# Patient Record
Sex: FEMALE | Race: WHITE | NOT HISPANIC OR LATINO | Employment: UNEMPLOYED | URBAN - METROPOLITAN AREA
[De-identification: names, ages, dates, MRNs, and addresses within clinical notes are randomized per-mention and may not be internally consistent; named-entity substitution may affect disease eponyms.]

---

## 2019-07-16 ENCOUNTER — OFFICE VISIT (OUTPATIENT)
Dept: URGENT CARE | Facility: PHYSICIAN GROUP | Age: 54
End: 2019-07-16
Payer: COMMERCIAL

## 2019-07-16 VITALS
DIASTOLIC BLOOD PRESSURE: 84 MMHG | OXYGEN SATURATION: 93 % | SYSTOLIC BLOOD PRESSURE: 130 MMHG | TEMPERATURE: 97.4 F | HEIGHT: 62 IN | WEIGHT: 200 LBS | HEART RATE: 62 BPM | BODY MASS INDEX: 36.8 KG/M2

## 2019-07-16 DIAGNOSIS — S39.012A STRAIN OF LUMBAR PARASPINAL MUSCLE, INITIAL ENCOUNTER: ICD-10-CM

## 2019-07-16 PROCEDURE — 99214 OFFICE O/P EST MOD 30 MIN: CPT | Performed by: NURSE PRACTITIONER

## 2019-07-16 RX ORDER — CYCLOBENZAPRINE HCL 5 MG
5-10 TABLET ORAL 3 TIMES DAILY PRN
Qty: 30 TAB | Refills: 0 | Status: SHIPPED
Start: 2019-07-16 | End: 2020-01-17

## 2019-07-16 RX ORDER — IBUPROFEN 600 MG/1
600 TABLET ORAL EVERY 6 HOURS PRN
Qty: 30 TAB | Refills: 0 | Status: SHIPPED | OUTPATIENT
Start: 2019-07-16

## 2019-07-16 ASSESSMENT — ENCOUNTER SYMPTOMS
TINGLING: 0
WEAKNESS: 0
BACK PAIN: 1

## 2019-07-16 NOTE — LETTER
July 16, 2019         Patient: Glory Kessler   YOB: 1965   Date of Visit: 7/16/2019           To Whom it May Concern:    Glory Kessler was seen in my clinic on 7/16/2019. She may return to work on 07/17/2019. May return sooner if symptoms improve.     If you have any questions or concerns, please don't hesitate to call.        Sincerely,           MIGUEL Gresham.  Electronically Signed

## 2019-07-16 NOTE — PATIENT INSTRUCTIONS
Low Back Strain  A strain is a stretch or tear in a muscle or the strong cords of tissue that attach muscle to bone (tendons). Strains of the lower back (lumbar spine) are a common cause of low back pain. A strain occurs when muscles or tendons are torn or are stretched beyond their limits. The muscles may become inflamed, resulting in pain and sudden muscle tightening (spasms). A strain can happen suddenly due to an injury (trauma), or it can develop gradually due to overuse.  There are three types of strains:  · Grade 1 is a mild strain involving a minor tear of the muscle fibers or tendons. This may cause some pain but no loss of muscle strength.  · Grade 2 is a moderate strain involving a partial tear of the muscle fibers or tendons. This causes more severe pain and some loss of muscle strength.  · Grade 3 is a severe strain involving a complete tear of the muscle or tendon. This causes severe pain and complete or nearly complete loss of muscle strength.  What are the causes?  This condition may be caused by:  · Trauma, such as a fall or a hit to the body.  · Twisting or overstretching the back. This may result from doing activities that require a lot of energy, such as lifting heavy objects.  What increases the risk?  The following factors may increase your risk of getting this condition:  · Playing contact sports.  · Participating in sports or activities that put excessive stress on the back and require a lot of bending and twisting, including:  ¨ Lifting weights or heavy objects.  ¨ Gymnastics.  ¨ Soccer.  ¨ Figure skating.  ¨ Snowboarding.  · Being overweight or obese.  · Having poor strength and flexibility.  What are the signs or symptoms?  Symptoms of this condition may include:  · Sharp or dull pain in the lower back that does not go away. Pain may extend to the buttocks.  · Stiffness.  · Limited range of motion.  · Inability to stand up straight due to stiffness or pain.  · Muscle spasms.  How is this  diagnosed?     This condition may be diagnosed based on:  · Your symptoms.  · Your medical history.  · A physical exam.  ¨ Your health care provider may push on certain areas of your back to determine the source of your pain.  ¨ You may be asked to bend forward, backward, and side to side to assess the severity of your pain and your range of motion.  · Imaging tests, such as:  ¨ X-rays.  ¨ MRI.  How is this treated?  Treatment for this condition may include:  · Applying heat and cold to the affected area.  · Medicines to help relieve pain and to relax your muscles (muscle relaxants).  · NSAIDs to help reduce swelling and discomfort.  · Physical therapy.  When your symptoms improve, it is important to gradually return to your normal routine as soon as possible to reduce pain, avoid stiffness, and avoid loss of muscle strength. Generally, symptoms should improve within 6 weeks of treatment. However, recovery time varies.  Follow these instructions at home:  Managing pain, stiffness, and swelling  · If directed, apply ice to the injured area during the first 24 hours after your injury.  ¨ Put ice in a plastic bag.  ¨ Place a towel between your skin and the bag.  ¨ Leave the ice on for 20 minutes, 2-3 times a day.  · If directed, apply heat to the affected area as often as told by your health care provider. Use the heat source that your health care provider recommends, such as a moist heat pack or a heating pad.  ¨ Place a towel between your skin and the heat source.  ¨ Leave the heat on for 20-30 minutes.  ¨ Remove the heat if your skin turns bright red. This is especially important if you are unable to feel pain, heat, or cold. You may have a greater risk of getting burned.  Activity  · Rest and return to your normal activities as told by your health care provider. Ask your health care provider what activities are safe for you.  · Avoid activities that take a lot of effort (are strenuous) for as long as told by your  health care provider.  · Do exercises as told by your health care provider.  General instructions  · Take over-the-counter and prescription medicines only as told by your health care provider.  · If you have questions or concerns about safety while taking pain medicine, talk with your health care provider.  · Do not drive or operate heavy machinery until you know how your pain medicine affects you.  · Do not use any tobacco products, such as cigarettes, chewing tobacco, and e-cigarettes. Tobacco can delay bone healing. If you need help quitting, ask your health care provider.  · Keep all follow-up visits as told by your health care provider. This is important.  How is this prevented?  · Warm up and stretch before being active.  · Cool down and stretch after being active.  · Give your body time to rest between periods of activity.  · Avoid:  ¨ Being physically inactive for long periods at a time.  ¨ Exercising or playing sports when you are tired or in pain.  · Use correct form when playing sports and lifting heavy objects.  · Use good posture when sitting and standing.  · Maintain a healthy weight.  · Sleep on a mattress with medium firmness to support your back.  · Make sure to use equipment that fits you, including shoes that fit well.  · Be safe and responsible while being active to avoid falls.  · Do at least 150 minutes of moderate-intensity exercise each week, such as brisk walking or water aerobics. Try a form of exercise that takes stress off your back, such as swimming or stationary cycling.  · Maintain physical fitness, including:  ¨ Strength.  ¨ Flexibility.  ¨ Cardiovascular fitness.  ¨ Endurance.  Contact a health care provider if:  · Your back pain does not improve after 6 weeks of treatment.  · Your symptoms get worse.  Get help right away if:  · Your back pain is severe.  · You are unable to stand or walk.  · You develop pain in your legs.  · You develop weakness in your buttocks or legs.  · You have  difficulty controlling when you urinate or when you have a bowel movement.  This information is not intended to replace advice given to you by your health care provider. Make sure you discuss any questions you have with your health care provider.  Document Released: 12/18/2006 Document Revised: 08/24/2017 Document Reviewed: 09/28/2016  [employer] Interactive Patient Education © 2017 [employer]    Low Back Strain  A strain is a stretch or tear in a muscle or the strong cords of tissue that attach muscle to bone (tendons). Strains of the lower back (lumbar spine) are a common cause of low back pain. A strain occurs when muscles or tendons are torn or are stretched beyond their limits. The muscles may become inflamed, resulting in pain and sudden muscle tightening (spasms). A strain can happen suddenly due to an injury (trauma), or it can develop gradually due to overuse.  There are three types of strains:  · Grade 1 is a mild strain involving a minor tear of the muscle fibers or tendons. This may cause some pain but no loss of muscle strength.  · Grade 2 is a moderate strain involving a partial tear of the muscle fibers or tendons. This causes more severe pain and some loss of muscle strength.  · Grade 3 is a severe strain involving a complete tear of the muscle or tendon. This causes severe pain and complete or nearly complete loss of muscle strength.  What are the causes?  This condition may be caused by:  · Trauma, such as a fall or a hit to the body.  · Twisting or overstretching the back. This may result from doing activities that require a lot of energy, such as lifting heavy objects.  What increases the risk?  The following factors may increase your risk of getting this condition:  · Playing contact sports.  · Participating in sports or activities that put excessive stress on the back and require a lot of bending and twisting, including:  ¨ Lifting weights or heavy objects.  ¨ Gymnastics.  ¨ Soccer.  ¨ Figure  skating.  ¨ Snowboarding.  · Being overweight or obese.  · Having poor strength and flexibility.  What are the signs or symptoms?  Symptoms of this condition may include:  · Sharp or dull pain in the lower back that does not go away. Pain may extend to the buttocks.  · Stiffness.  · Limited range of motion.  · Inability to stand up straight due to stiffness or pain.  · Muscle spasms.  How is this diagnosed?     This condition may be diagnosed based on:  · Your symptoms.  · Your medical history.  · A physical exam.  ¨ Your health care provider may push on certain areas of your back to determine the source of your pain.  ¨ You may be asked to bend forward, backward, and side to side to assess the severity of your pain and your range of motion.  · Imaging tests, such as:  ¨ X-rays.  ¨ MRI.  How is this treated?  Treatment for this condition may include:  · Applying heat and cold to the affected area.  · Medicines to help relieve pain and to relax your muscles (muscle relaxants).  · NSAIDs to help reduce swelling and discomfort.  · Physical therapy.  When your symptoms improve, it is important to gradually return to your normal routine as soon as possible to reduce pain, avoid stiffness, and avoid loss of muscle strength. Generally, symptoms should improve within 6 weeks of treatment. However, recovery time varies.  Follow these instructions at home:  Managing pain, stiffness, and swelling  · If directed, apply ice to the injured area during the first 24 hours after your injury.  ¨ Put ice in a plastic bag.  ¨ Place a towel between your skin and the bag.  ¨ Leave the ice on for 20 minutes, 2-3 times a day.  · If directed, apply heat to the affected area as often as told by your health care provider. Use the heat source that your health care provider recommends, such as a moist heat pack or a heating pad.  ¨ Place a towel between your skin and the heat source.  ¨ Leave the heat on for 20-30 minutes.  ¨ Remove the heat if  your skin turns bright red. This is especially important if you are unable to feel pain, heat, or cold. You may have a greater risk of getting burned.  Activity  · Rest and return to your normal activities as told by your health care provider. Ask your health care provider what activities are safe for you.  · Avoid activities that take a lot of effort (are strenuous) for as long as told by your health care provider.  · Do exercises as told by your health care provider.  General instructions  · Take over-the-counter and prescription medicines only as told by your health care provider.  · If you have questions or concerns about safety while taking pain medicine, talk with your health care provider.  · Do not drive or operate heavy machinery until you know how your pain medicine affects you.  · Do not use any tobacco products, such as cigarettes, chewing tobacco, and e-cigarettes. Tobacco can delay bone healing. If you need help quitting, ask your health care provider.  · Keep all follow-up visits as told by your health care provider. This is important.  How is this prevented?  · Warm up and stretch before being active.  · Cool down and stretch after being active.  · Give your body time to rest between periods of activity.  · Avoid:  ¨ Being physically inactive for long periods at a time.  ¨ Exercising or playing sports when you are tired or in pain.  · Use correct form when playing sports and lifting heavy objects.  · Use good posture when sitting and standing.  · Maintain a healthy weight.  · Sleep on a mattress with medium firmness to support your back.  · Make sure to use equipment that fits you, including shoes that fit well.  · Be safe and responsible while being active to avoid falls.  · Do at least 150 minutes of moderate-intensity exercise each week, such as brisk walking or water aerobics. Try a form of exercise that takes stress off your back, such as swimming or stationary cycling.  · Maintain physical  "fitness, including:  ¨ Strength.  ¨ Flexibility.  ¨ Cardiovascular fitness.  ¨ Endurance.  Contact a health care provider if:  · Your back pain does not improve after 6 weeks of treatment.  · Your symptoms get worse.  Get help right away if:  · Your back pain is severe.  · You are unable to stand or walk.  · You develop pain in your legs.  · You develop weakness in your buttocks or legs.  · You have difficulty controlling when you urinate or when you have a bowel movement.  This information is not intended to replace advice given to you by your health care provider. Make sure you discuss any questions you have with your health care provider.  Document Released: 12/18/2006 Document Revised: 08/24/2017 Document Reviewed: 09/28/2016  [employer] Interactive Patient Education © 2017 [employer]    Low Back Sprain Rehab  Ask your health care provider which exercises are safe for you. Do exercises exactly as told by your health care provider and adjust them as directed. It is normal to feel mild stretching, pulling, tightness, or discomfort as you do these exercises, but you should stop right away if you feel sudden pain or your pain gets worse. Do not begin these exercises until told by your health care provider.  Stretching and range of motion exercises  These exercises warm up your muscles and joints and improve the movement and flexibility of your back. These exercises also help to relieve pain, numbness, and tingling.  Exercise A: Lumbar rotation  1. Lie on your back on a firm surface and bend your knees.  2. Straighten your arms out to your sides so each arm forms an \"L\" shape with a side of your body (a 90 degree angle).  3. Slowly move both of your knees to one side of your body until you feel a stretch in your lower back. Try not to let your shoulders move off of the floor.  4. Hold for __________ seconds.  5. Tense your abdominal muscles and slowly move your knees back to the starting position.  6. Repeat this " exercise on the other side of your body.  Repeat __________ times. Complete this exercise __________ times a day.  Exercise B: Prone extension on elbows  1. Lie on your abdomen on a firm surface.  2. Prop yourself up on your elbows.  3. Use your arms to help lift your chest up until you feel a gentle stretch in your abdomen and your lower back.  ¨ This will place some of your body weight on your elbows. If this is uncomfortable, try stacking pillows under your chest.  ¨ Your hips should stay down, against the surface that you are lying on. Keep your hip and back muscles relaxed.  4. Hold for __________ seconds.  5. Slowly relax your upper body and return to the starting position.  Repeat __________ times. Complete this exercise __________ times a day.  Strengthening exercises  These exercises build strength and endurance in your back. Endurance is the ability to use your muscles for a long time, even after they get tired.  Exercise C: Pelvic tilt  1. Lie on your back on a firm surface. Bend your knees and keep your feet flat.  2. Tense your abdominal muscles. Tip your pelvis up toward the ceiling and flatten your lower back into the floor.  ¨ To help with this exercise, you may place a small towel under your lower back and try to push your back into the towel.  3. Hold for __________ seconds.  4. Let your muscles relax completely before you repeat this exercise.  Repeat __________ times. Complete this exercise __________ times a day.  Exercise D: Alternating arm and leg raises  1. Get on your hands and knees on a firm surface. If you are on a hard floor, you may want to use padding to cushion your knees, such as an exercise mat.  2. Line up your arms and legs. Your hands should be below your shoulders, and your knees should be below your hips.  3. Lift your left leg behind you. At the same time, raise your right arm and straighten it in front of you.  ¨ Do not lift your leg higher than your hip.  ¨ Do not lift your  arm higher than your shoulder.  ¨ Keep your abdominal and back muscles tight.  ¨ Keep your hips facing the ground.  ¨ Do not arch your back.  ¨ Keep your balance carefully, and do not hold your breath.  4. Hold for __________ seconds.  5. Slowly return to the starting position and repeat with your right leg and your left arm.  Repeat __________ times. Complete this exercise __________ times a day.  Exercise E: Abdominal set with straight leg raise  1. Lie on your back on a firm surface.  2. Bend one of your knees and keep your other leg straight.  3. Tense your abdominal muscles and lift your straight leg up, 4-6 inches (10-15 cm) off the ground.  4. Keep your abdominal muscles tight and hold for __________ seconds.  ¨ Do not hold your breath.  ¨ Do not arch your back. Keep it flat against the ground.  5. Keep your abdominal muscles tense as you slowly lower your leg back to the starting position.  6. Repeat with your other leg.  Repeat __________ times. Complete this exercise __________ times a day.  Posture and body mechanics     Body mechanics refers to the movements and positions of your body while you do your daily activities. Posture is part of body mechanics. Good posture and healthy body mechanics can help to relieve stress in your body's tissues and joints. Good posture means that your spine is in its natural S-curve position (your spine is neutral), your shoulders are pulled back slightly, and your head is not tipped forward. The following are general guidelines for applying improved posture and body mechanics to your everyday activities.  Standing    · When standing, keep your spine neutral and your feet about hip-width apart. Keep a slight bend in your knees. Your ears, shoulders, and hips should line up.  · When you do a task in which you  one place for a long time, place one foot up on a stable object that is 2-4 inches (5-10 cm) high, such as a footstool. This helps keep your spine  neutral.  Sitting  · When sitting, keep your spine neutral and keep your feet flat on the floor. Use a footrest, if necessary, and keep your thighs parallel to the floor. Avoid rounding your shoulders, and avoid tilting your head forward.  · When working at a desk or a computer, keep your desk at a height where your hands are slightly lower than your elbows. Slide your chair under your desk so you are close enough to maintain good posture.  · When working at a computer, place your monitor at a height where you are looking straight ahead and you do not have to tilt your head forward or downward to look at the screen.  Resting    · When lying down and resting, avoid positions that are most painful for you.  · If you have pain with activities such as sitting, bending, stooping, or squatting (flexion-based activities), lie in a position in which your body does not bend very much. For example, avoid curling up on your side with your arms and knees near your chest (fetal position).  · If you have pain with activities such as standing for a long time or reaching with your arms (extension-based activities), lie with your spine in a neutral position and bend your knees slightly. Try the following positions:  · Lying on your side with a pillow between your knees.  · Lying on your back with a pillow under your knees.  Lifting    · When lifting objects, keep your feet at least shoulder-width apart and tighten your abdominal muscles.  · Bend your knees and hips and keep your spine neutral. It is important to lift using the strength of your legs, not your back. Do not lock your knees straight out.  · Always ask for help to lift heavy or awkward objects.  This information is not intended to replace advice given to you by your health care provider. Make sure you discuss any questions you have with your health care provider.  Document Released: 12/18/2006 Document Revised: 08/24/2017 Document Reviewed: 09/28/2016  [employer]  Interactive Patient Education © 2017 [employer]

## 2019-07-16 NOTE — PROGRESS NOTES
Subjective:     Glory Kessler is a 54 y.o. female who presents for Back Pain (L lower back pain/hip pain, constantly hurting, x1 day )      No history of injury or pain of back pain. States she was playing air plane with her grandchild and felt the pain. Has left lower hip//pain. Severe last nigh, which worried hert.       Back Pain   This is a new problem. The current episode started yesterday. The problem occurs constantly. The problem has been gradually worsening since onset. Pertinent negatives include no fever, pelvic pain, tingling or weakness. Risk factors: No history otsteo , cancer, steroid use.  Treatments tried: Aleve. The treatment provided no relief.       Past Medical History:   Diagnosis Date   • Carpal tunnel syndrome        Past Surgical History:   Procedure Laterality Date   • OTHER      breast augmentation, tubal ligation   • TONSILLECTOMY         Social History     Social History   • Marital status:      Spouse name: N/A   • Number of children: N/A   • Years of education: N/A     Occupational History   • Not on file.     Social History Main Topics   • Smoking status: Former Smoker     Types: Cigarettes   • Smokeless tobacco: Never Used      Comment: quit 3 years ago.   • Alcohol use Yes      Comment: occasional   • Drug use: No   • Sexual activity: Not on file     Other Topics Concern   • Not on file     Social History Narrative   • No narrative on file        Family History   Problem Relation Age of Onset   • Arthritis Mother    • Cancer Maternal Grandmother         Allergies   Allergen Reactions   • Doxycycline Rash     Face - possible rash from medication 7/7   • Pcn [Penicillins]        Review of Systems   Constitutional: Negative for chills and fever.   Genitourinary: Negative for pelvic pain.   Musculoskeletal: Positive for back pain.   Neurological: Negative for tingling, sensory change, focal weakness and weakness.   All other systems reviewed and are negative.       Objective:  "  /84 (BP Location: Right arm, Patient Position: Sitting, BP Cuff Size: Adult)   Pulse 62   Temp 36.3 °C (97.4 °F) (Temporal)   Ht 1.575 m (5' 2\")   Wt 90.7 kg (200 lb)   SpO2 93%   BMI 36.58 kg/m²     Physical Exam   Constitutional: She is oriented to person, place, and time. She appears well-developed. No distress.   HENT:   Head: Normocephalic.   Right Ear: External ear normal.   Left Ear: External ear normal.   Nose: Nose normal.   Mouth/Throat: Oropharynx is clear and moist.   Eyes: Pupils are equal, round, and reactive to light. Conjunctivae are normal.   Neck: Normal range of motion. Neck supple.   Cardiovascular: Normal rate.    Pulmonary/Chest: Effort normal.   Musculoskeletal: Normal range of motion. She exhibits tenderness.        Cervical back: Normal.        Thoracic back: Normal.        Lumbar back: She exhibits tenderness. She exhibits no bony tenderness, no swelling, no edema and no deformity.        Back:    Neurological: She is alert and oriented to person, place, and time. She has normal strength. No sensory deficit. GCS eye subscore is 4. GCS verbal subscore is 5. GCS motor subscore is 6.   Skin: Skin is warm and dry.   Psychiatric: Her behavior is normal. Judgment and thought content normal.   Vitals reviewed.      Assessment/Plan:   1. Strain of lumbar paraspinal muscle, initial encounter  - cyclobenzaprine (FLEXERIL) 5 MG tablet; Take 1-2 Tabs by mouth 3 times a day as needed.  Dispense: 30 Tab; Refill: 0  - ibuprofen (MOTRIN) 600 MG Tab; Take 1 Tab by mouth every 6 hours as needed.  Dispense: 30 Tab; Refill: 0  Temperature therapy: Ice or heat as tolerated.    Follow up for persistent back pain, numbness, weakness, paresthesia, loss of bowel or bladder, or any other concerns.    Differential diagnosis, natural history, supportive care, and indications for immediate follow-up discussed.  "

## 2019-07-16 NOTE — LETTER
July 16, 2019         Patient: Glory Kessler   YOB: 1965   Date of Visit: 7/16/2019           To Whom it May Concern:    Glory Kessler was seen in my clinic on 7/16/2019. She may return to work on 07/18/2019. May return sooner if symptoms improve.    If you have any questions or concerns, please don't hesitate to call.        Sincerely,           MIGUEL Gresham.  Electronically Signed

## 2019-07-19 ASSESSMENT — ENCOUNTER SYMPTOMS
FEVER: 0
FOCAL WEAKNESS: 0
SENSORY CHANGE: 0
CHILLS: 0

## 2020-01-17 ENCOUNTER — HOSPITAL ENCOUNTER (INPATIENT)
Facility: MEDICAL CENTER | Age: 55
LOS: 5 days | DRG: 871 | End: 2020-01-22
Attending: EMERGENCY MEDICINE | Admitting: HOSPITALIST
Payer: COMMERCIAL

## 2020-01-17 ENCOUNTER — APPOINTMENT (OUTPATIENT)
Dept: RADIOLOGY | Facility: MEDICAL CENTER | Age: 55
DRG: 871 | End: 2020-01-17
Attending: EMERGENCY MEDICINE
Payer: COMMERCIAL

## 2020-01-17 DIAGNOSIS — J18.9 PNEUMONIA OF RIGHT LOWER LOBE DUE TO INFECTIOUS ORGANISM: ICD-10-CM

## 2020-01-17 PROBLEM — E87.6 HYPOKALEMIA: Status: ACTIVE | Noted: 2020-01-17

## 2020-01-17 PROBLEM — E87.1 HYPONATREMIA: Status: ACTIVE | Noted: 2020-01-17

## 2020-01-17 PROBLEM — J06.9 UPPER RESPIRATORY INFECTION, ACUTE: Status: ACTIVE | Noted: 2020-01-17

## 2020-01-17 LAB
ANION GAP SERPL CALC-SCNC: 13 MMOL/L (ref 0–11.9)
APPEARANCE UR: CLEAR
BACTERIA #/AREA URNS HPF: ABNORMAL /HPF
BASOPHILS # BLD AUTO: 0.2 % (ref 0–1.8)
BASOPHILS # BLD: 0.02 K/UL (ref 0–0.12)
BILIRUB UR QL STRIP.AUTO: NEGATIVE
BUN SERPL-MCNC: 9 MG/DL (ref 8–22)
CALCIUM SERPL-MCNC: 9.1 MG/DL (ref 8.5–10.5)
CHLORIDE SERPL-SCNC: 96 MMOL/L (ref 96–112)
CO2 SERPL-SCNC: 21 MMOL/L (ref 20–33)
COLOR UR: YELLOW
CREAT SERPL-MCNC: 0.75 MG/DL (ref 0.5–1.4)
EOSINOPHIL # BLD AUTO: 0 K/UL (ref 0–0.51)
EOSINOPHIL NFR BLD: 0 % (ref 0–6.9)
EPI CELLS #/AREA URNS HPF: ABNORMAL /HPF
ERYTHROCYTE [DISTWIDTH] IN BLOOD BY AUTOMATED COUNT: 41.7 FL (ref 35.9–50)
FLUAV RNA SPEC QL NAA+PROBE: NEGATIVE
FLUBV RNA SPEC QL NAA+PROBE: NEGATIVE
GLUCOSE SERPL-MCNC: 115 MG/DL (ref 65–99)
GLUCOSE UR STRIP.AUTO-MCNC: NEGATIVE MG/DL
HCT VFR BLD AUTO: 37.5 % (ref 37–47)
HGB BLD-MCNC: 13.3 G/DL (ref 12–16)
HYALINE CASTS #/AREA URNS LPF: ABNORMAL /LPF
IMM GRANULOCYTES # BLD AUTO: 0.1 K/UL (ref 0–0.11)
IMM GRANULOCYTES NFR BLD AUTO: 0.8 % (ref 0–0.9)
INR PPP: 1.03 (ref 0.87–1.13)
KETONES UR STRIP.AUTO-MCNC: ABNORMAL MG/DL
LACTATE BLD-SCNC: 1.1 MMOL/L (ref 0.5–2)
LACTATE BLD-SCNC: 1.9 MMOL/L (ref 0.5–2)
LEUKOCYTE ESTERASE UR QL STRIP.AUTO: ABNORMAL
LYMPHOCYTES # BLD AUTO: 0.81 K/UL (ref 1–4.8)
LYMPHOCYTES NFR BLD: 6.8 % (ref 22–41)
MAGNESIUM SERPL-MCNC: 1.5 MG/DL (ref 1.5–2.5)
MCH RBC QN AUTO: 32.4 PG (ref 27–33)
MCHC RBC AUTO-ENTMCNC: 35.5 G/DL (ref 33.6–35)
MCV RBC AUTO: 91.5 FL (ref 81.4–97.8)
MICRO URNS: ABNORMAL
MONOCYTES # BLD AUTO: 0.62 K/UL (ref 0–0.85)
MONOCYTES NFR BLD AUTO: 5.2 % (ref 0–13.4)
NEUTROPHILS # BLD AUTO: 10.34 K/UL (ref 2–7.15)
NEUTROPHILS NFR BLD: 87 % (ref 44–72)
NITRITE UR QL STRIP.AUTO: NEGATIVE
NRBC # BLD AUTO: 0 K/UL
NRBC BLD-RTO: 0 /100 WBC
PH UR STRIP.AUTO: 7 [PH] (ref 5–8)
PLATELET # BLD AUTO: 238 K/UL (ref 164–446)
PMV BLD AUTO: 9.9 FL (ref 9–12.9)
POTASSIUM SERPL-SCNC: 3.1 MMOL/L (ref 3.6–5.5)
PROCALCITONIN SERPL-MCNC: 0.87 NG/ML
PROT UR QL STRIP: 30 MG/DL
PROTHROMBIN TIME: 13.7 SEC (ref 12–14.6)
RBC # BLD AUTO: 4.1 M/UL (ref 4.2–5.4)
RBC # URNS HPF: ABNORMAL /HPF
RBC UR QL AUTO: NEGATIVE
RENAL EPI CELLS #/AREA URNS HPF: ABNORMAL /HPF
SODIUM SERPL-SCNC: 130 MMOL/L (ref 135–145)
SP GR UR STRIP.AUTO: 1.01
UROBILINOGEN UR STRIP.AUTO-MCNC: 1 MG/DL
WBC # BLD AUTO: 11.9 K/UL (ref 4.8–10.8)
WBC #/AREA URNS HPF: ABNORMAL /HPF

## 2020-01-17 PROCEDURE — 99285 EMERGENCY DEPT VISIT HI MDM: CPT

## 2020-01-17 PROCEDURE — 96365 THER/PROPH/DIAG IV INF INIT: CPT

## 2020-01-17 PROCEDURE — 87502 INFLUENZA DNA AMP PROBE: CPT

## 2020-01-17 PROCEDURE — 700102 HCHG RX REV CODE 250 W/ 637 OVERRIDE(OP): Performed by: EMERGENCY MEDICINE

## 2020-01-17 PROCEDURE — 83735 ASSAY OF MAGNESIUM: CPT

## 2020-01-17 PROCEDURE — 85610 PROTHROMBIN TIME: CPT

## 2020-01-17 PROCEDURE — 94760 N-INVAS EAR/PLS OXIMETRY 1: CPT

## 2020-01-17 PROCEDURE — 770006 HCHG ROOM/CARE - MED/SURG/GYN SEMI*

## 2020-01-17 PROCEDURE — 87077 CULTURE AEROBIC IDENTIFY: CPT

## 2020-01-17 PROCEDURE — A9270 NON-COVERED ITEM OR SERVICE: HCPCS | Performed by: HOSPITALIST

## 2020-01-17 PROCEDURE — 99223 1ST HOSP IP/OBS HIGH 75: CPT | Performed by: HOSPITALIST

## 2020-01-17 PROCEDURE — 96368 THER/DIAG CONCURRENT INF: CPT

## 2020-01-17 PROCEDURE — 304561 HCHG STAT O2

## 2020-01-17 PROCEDURE — 700111 HCHG RX REV CODE 636 W/ 250 OVERRIDE (IP): Performed by: EMERGENCY MEDICINE

## 2020-01-17 PROCEDURE — 80048 BASIC METABOLIC PNL TOTAL CA: CPT

## 2020-01-17 PROCEDURE — 700105 HCHG RX REV CODE 258: Performed by: EMERGENCY MEDICINE

## 2020-01-17 PROCEDURE — 71045 X-RAY EXAM CHEST 1 VIEW: CPT

## 2020-01-17 PROCEDURE — 700105 HCHG RX REV CODE 258: Performed by: HOSPITALIST

## 2020-01-17 PROCEDURE — 81001 URINALYSIS AUTO W/SCOPE: CPT

## 2020-01-17 PROCEDURE — A9270 NON-COVERED ITEM OR SERVICE: HCPCS | Performed by: EMERGENCY MEDICINE

## 2020-01-17 PROCEDURE — 84145 PROCALCITONIN (PCT): CPT

## 2020-01-17 PROCEDURE — 83605 ASSAY OF LACTIC ACID: CPT | Mod: 91

## 2020-01-17 PROCEDURE — 87086 URINE CULTURE/COLONY COUNT: CPT

## 2020-01-17 PROCEDURE — 700102 HCHG RX REV CODE 250 W/ 637 OVERRIDE(OP): Performed by: HOSPITALIST

## 2020-01-17 PROCEDURE — 96375 TX/PRO/DX INJ NEW DRUG ADDON: CPT

## 2020-01-17 PROCEDURE — 85025 COMPLETE CBC W/AUTO DIFF WBC: CPT

## 2020-01-17 PROCEDURE — 87040 BLOOD CULTURE FOR BACTERIA: CPT | Mod: 91

## 2020-01-17 PROCEDURE — 36415 COLL VENOUS BLD VENIPUNCTURE: CPT

## 2020-01-17 RX ORDER — OMEPRAZOLE 20 MG/1
20 CAPSULE, DELAYED RELEASE ORAL DAILY
Status: DISCONTINUED | OUTPATIENT
Start: 2020-01-17 | End: 2020-01-22 | Stop reason: HOSPADM

## 2020-01-17 RX ORDER — PROMETHAZINE HYDROCHLORIDE 25 MG/1
12.5-25 TABLET ORAL EVERY 4 HOURS PRN
Status: DISCONTINUED | OUTPATIENT
Start: 2020-01-17 | End: 2020-01-22 | Stop reason: HOSPADM

## 2020-01-17 RX ORDER — ONDANSETRON 2 MG/ML
4 INJECTION INTRAMUSCULAR; INTRAVENOUS ONCE
Status: COMPLETED | OUTPATIENT
Start: 2020-01-17 | End: 2020-01-17

## 2020-01-17 RX ORDER — SODIUM CHLORIDE 9 MG/ML
1000 INJECTION, SOLUTION INTRAVENOUS ONCE
Status: COMPLETED | OUTPATIENT
Start: 2020-01-17 | End: 2020-01-17

## 2020-01-17 RX ORDER — ACETAMINOPHEN 325 MG/1
650 TABLET ORAL EVERY 6 HOURS PRN
Status: DISCONTINUED | OUTPATIENT
Start: 2020-01-17 | End: 2020-01-22 | Stop reason: HOSPADM

## 2020-01-17 RX ORDER — AZITHROMYCIN 250 MG/1
500 TABLET, FILM COATED ORAL DAILY
Status: COMPLETED | OUTPATIENT
Start: 2020-01-18 | End: 2020-01-19

## 2020-01-17 RX ORDER — BENZONATATE 100 MG/1
100 CAPSULE ORAL 3 TIMES DAILY PRN
Status: DISCONTINUED | OUTPATIENT
Start: 2020-01-17 | End: 2020-01-22 | Stop reason: HOSPADM

## 2020-01-17 RX ORDER — AZITHROMYCIN 500 MG/1
500 INJECTION, POWDER, LYOPHILIZED, FOR SOLUTION INTRAVENOUS ONCE
Status: COMPLETED | OUTPATIENT
Start: 2020-01-17 | End: 2020-01-17

## 2020-01-17 RX ORDER — ACETAMINOPHEN 325 MG/1
650 TABLET ORAL ONCE
Status: COMPLETED | OUTPATIENT
Start: 2020-01-17 | End: 2020-01-17

## 2020-01-17 RX ORDER — AMOXICILLIN 250 MG
2 CAPSULE ORAL 2 TIMES DAILY
Status: DISCONTINUED | OUTPATIENT
Start: 2020-01-17 | End: 2020-01-22 | Stop reason: HOSPADM

## 2020-01-17 RX ORDER — SODIUM CHLORIDE, SODIUM LACTATE, POTASSIUM CHLORIDE, CALCIUM CHLORIDE 600; 310; 30; 20 MG/100ML; MG/100ML; MG/100ML; MG/100ML
INJECTION, SOLUTION INTRAVENOUS CONTINUOUS
Status: DISCONTINUED | OUTPATIENT
Start: 2020-01-17 | End: 2020-01-19

## 2020-01-17 RX ORDER — SODIUM CHLORIDE, SODIUM LACTATE, POTASSIUM CHLORIDE, AND CALCIUM CHLORIDE .6; .31; .03; .02 G/100ML; G/100ML; G/100ML; G/100ML
500 INJECTION, SOLUTION INTRAVENOUS
Status: ACTIVE | OUTPATIENT
Start: 2020-01-17 | End: 2020-01-18

## 2020-01-17 RX ORDER — PROMETHAZINE HYDROCHLORIDE 12.5 MG/1
12.5-25 SUPPOSITORY RECTAL EVERY 4 HOURS PRN
Status: DISCONTINUED | OUTPATIENT
Start: 2020-01-17 | End: 2020-01-22 | Stop reason: HOSPADM

## 2020-01-17 RX ORDER — BISACODYL 10 MG
10 SUPPOSITORY, RECTAL RECTAL
Status: DISCONTINUED | OUTPATIENT
Start: 2020-01-17 | End: 2020-01-22 | Stop reason: HOSPADM

## 2020-01-17 RX ORDER — ACETAMINOPHEN 500 MG
500-1000 TABLET ORAL EVERY 6 HOURS PRN
COMMUNITY

## 2020-01-17 RX ORDER — POLYETHYLENE GLYCOL 3350 17 G/17G
1 POWDER, FOR SOLUTION ORAL
Status: DISCONTINUED | OUTPATIENT
Start: 2020-01-17 | End: 2020-01-22 | Stop reason: HOSPADM

## 2020-01-17 RX ORDER — ONDANSETRON 2 MG/ML
4 INJECTION INTRAMUSCULAR; INTRAVENOUS EVERY 4 HOURS PRN
Status: DISCONTINUED | OUTPATIENT
Start: 2020-01-17 | End: 2020-01-22 | Stop reason: HOSPADM

## 2020-01-17 RX ORDER — SODIUM CHLORIDE, SODIUM LACTATE, POTASSIUM CHLORIDE, AND CALCIUM CHLORIDE .6; .31; .03; .02 G/100ML; G/100ML; G/100ML; G/100ML
30 INJECTION, SOLUTION INTRAVENOUS
Status: ACTIVE | OUTPATIENT
Start: 2020-01-17 | End: 2020-01-18

## 2020-01-17 RX ORDER — POTASSIUM CHLORIDE 20 MEQ/1
40 TABLET, EXTENDED RELEASE ORAL 3 TIMES DAILY
Status: COMPLETED | OUTPATIENT
Start: 2020-01-17 | End: 2020-01-17

## 2020-01-17 RX ORDER — OMEPRAZOLE 20 MG/1
20 CAPSULE, DELAYED RELEASE ORAL DAILY
COMMUNITY
End: 2020-01-17

## 2020-01-17 RX ORDER — ONDANSETRON 4 MG/1
4 TABLET, ORALLY DISINTEGRATING ORAL EVERY 4 HOURS PRN
Status: DISCONTINUED | OUTPATIENT
Start: 2020-01-17 | End: 2020-01-22 | Stop reason: HOSPADM

## 2020-01-17 RX ORDER — IBUPROFEN 600 MG/1
600 TABLET ORAL ONCE
Status: COMPLETED | OUTPATIENT
Start: 2020-01-17 | End: 2020-01-17

## 2020-01-17 RX ORDER — ENALAPRILAT 1.25 MG/ML
1.25 INJECTION INTRAVENOUS EVERY 6 HOURS PRN
Status: DISCONTINUED | OUTPATIENT
Start: 2020-01-17 | End: 2020-01-22 | Stop reason: HOSPADM

## 2020-01-17 RX ORDER — PROCHLORPERAZINE EDISYLATE 5 MG/ML
5-10 INJECTION INTRAMUSCULAR; INTRAVENOUS EVERY 4 HOURS PRN
Status: DISCONTINUED | OUTPATIENT
Start: 2020-01-17 | End: 2020-01-22 | Stop reason: HOSPADM

## 2020-01-17 RX ADMIN — ACETAMINOPHEN 650 MG: 325 TABLET, FILM COATED ORAL at 18:11

## 2020-01-17 RX ADMIN — AZITHROMYCIN MONOHYDRATE 500 MG: 500 INJECTION, POWDER, LYOPHILIZED, FOR SOLUTION INTRAVENOUS at 10:50

## 2020-01-17 RX ADMIN — POTASSIUM CHLORIDE 40 MEQ: 1500 TABLET, EXTENDED RELEASE ORAL at 13:25

## 2020-01-17 RX ADMIN — IBUPROFEN 600 MG: 600 TABLET ORAL at 10:49

## 2020-01-17 RX ADMIN — ONDANSETRON 4 MG: 2 INJECTION INTRAMUSCULAR; INTRAVENOUS at 10:59

## 2020-01-17 RX ADMIN — SODIUM CHLORIDE 1000 ML: 9 INJECTION, SOLUTION INTRAVENOUS at 10:45

## 2020-01-17 RX ADMIN — OMEPRAZOLE 20 MG: 20 CAPSULE, DELAYED RELEASE ORAL at 13:24

## 2020-01-17 RX ADMIN — POTASSIUM CHLORIDE 40 MEQ: 1500 TABLET, EXTENDED RELEASE ORAL at 18:02

## 2020-01-17 RX ADMIN — SODIUM CHLORIDE, POTASSIUM CHLORIDE, SODIUM LACTATE AND CALCIUM CHLORIDE: 600; 310; 30; 20 INJECTION, SOLUTION INTRAVENOUS at 18:11

## 2020-01-17 RX ADMIN — ACETAMINOPHEN 650 MG: 325 TABLET, FILM COATED ORAL at 10:49

## 2020-01-17 RX ADMIN — CEFTRIAXONE SODIUM 2 G: 2 INJECTION, POWDER, FOR SOLUTION INTRAMUSCULAR; INTRAVENOUS at 10:49

## 2020-01-17 SDOH — ECONOMIC STABILITY: TRANSPORTATION INSECURITY
IN THE PAST 12 MONTHS, HAS THE LACK OF TRANSPORTATION KEPT YOU FROM MEDICAL APPOINTMENTS OR FROM GETTING MEDICATIONS?: NO

## 2020-01-17 SDOH — ECONOMIC STABILITY: FOOD INSECURITY: WITHIN THE PAST 12 MONTHS, YOU WORRIED THAT YOUR FOOD WOULD RUN OUT BEFORE YOU GOT MONEY TO BUY MORE.: NEVER TRUE

## 2020-01-17 SDOH — ECONOMIC STABILITY: FOOD INSECURITY: WITHIN THE PAST 12 MONTHS, THE FOOD YOU BOUGHT JUST DIDN'T LAST AND YOU DIDN'T HAVE MONEY TO GET MORE.: NEVER TRUE

## 2020-01-17 SDOH — ECONOMIC STABILITY: TRANSPORTATION INSECURITY
IN THE PAST 12 MONTHS, HAS LACK OF TRANSPORTATION KEPT YOU FROM MEETINGS, WORK, OR FROM GETTING THINGS NEEDED FOR DAILY LIVING?: NO

## 2020-01-17 ASSESSMENT — LIFESTYLE VARIABLES
TOTAL SCORE: 0
TOTAL SCORE: 0
CONSUMPTION TOTAL: NEGATIVE
ON A TYPICAL DAY WHEN YOU DRINK ALCOHOL HOW MANY DRINKS DO YOU HAVE: 1
HAVE PEOPLE ANNOYED YOU BY CRITICIZING YOUR DRINKING: NO
TOTAL SCORE: 0
EVER HAD A DRINK FIRST THING IN THE MORNING TO STEADY YOUR NERVES TO GET RID OF A HANGOVER: NO
HAVE YOU EVER FELT YOU SHOULD CUT DOWN ON YOUR DRINKING: NO
ALCOHOL_USE: YES
AVERAGE NUMBER OF DAYS PER WEEK YOU HAVE A DRINK CONTAINING ALCOHOL: 1
DOES PATIENT WANT TO STOP DRINKING: NO
HOW MANY TIMES IN THE PAST YEAR HAVE YOU HAD 5 OR MORE DRINKS IN A DAY: 0
EVER FELT BAD OR GUILTY ABOUT YOUR DRINKING: NO
EVER_SMOKED: NEVER

## 2020-01-17 ASSESSMENT — COGNITIVE AND FUNCTIONAL STATUS - GENERAL
MOBILITY SCORE: 20
MOVING TO AND FROM BED TO CHAIR: A LITTLE
DAILY ACTIVITIY SCORE: 24
STANDING UP FROM CHAIR USING ARMS: A LITTLE
CLIMB 3 TO 5 STEPS WITH RAILING: A LITTLE
SUGGESTED CMS G CODE MODIFIER MOBILITY: CJ
SUGGESTED CMS G CODE MODIFIER DAILY ACTIVITY: CH
MOVING FROM LYING ON BACK TO SITTING ON SIDE OF FLAT BED: A LITTLE

## 2020-01-17 ASSESSMENT — ENCOUNTER SYMPTOMS
DIARRHEA: 1
CONSTIPATION: 0
SORE THROAT: 1
NAUSEA: 1
SPEECH CHANGE: 0
SPUTUM PRODUCTION: 0
FEVER: 0
DIAPHORESIS: 1
CHILLS: 1
MYALGIAS: 1
SINUS PAIN: 1
NECK PAIN: 0
COUGH: 1
NERVOUS/ANXIOUS: 0
HEADACHES: 0
ABDOMINAL PAIN: 0
VOMITING: 0
DIZZINESS: 0
SHORTNESS OF BREATH: 1

## 2020-01-17 ASSESSMENT — PATIENT HEALTH QUESTIONNAIRE - PHQ9
2. FEELING DOWN, DEPRESSED, IRRITABLE, OR HOPELESS: NOT AT ALL
SUM OF ALL RESPONSES TO PHQ9 QUESTIONS 1 AND 2: 0
1. LITTLE INTEREST OR PLEASURE IN DOING THINGS: NOT AT ALL

## 2020-01-17 NOTE — ED TRIAGE NOTES
54 y/o female ambulatory to triage with c/o flu like symptoms since Tuesday. Pt states she has a headache, body aches, cough and is now feeling sob.

## 2020-01-17 NOTE — ED NOTES
Pts o2 sats at 94% on room air at bedside, pt reports sob, placed on 2 liters via nasal cannula.

## 2020-01-17 NOTE — ED PROVIDER NOTES
ED Provider Note    Scribed for Chet Dao M.D. by Deirdre Nava. 1/17/2020  10:06 AM    Primary care provider: Pcp Pt States None  Means of arrival: Walk-in  History obtained from: Patient  History limited by: None    CHIEF COMPLAINT  Chief Complaint   Patient presents with   • Flu Like Symptoms   • Body Aches   • Headache   • Shortness of Breath     HPI  Glory Kessler is a 55 y.o. female who presents to the Emergency Department for fever and chills that began 4 days ago. In the last 24 hours, her symptoms have worsened and she has begun to experience shortness of breath with a dry cough. The patient reports mild nausea, diarrhea, mild abdominal cramping, and headache with temporal and maxillary sinus pressure. She denies vomiting or chest pain. She states she received a flu vaccine this year. Additionally, she denies any sick contact or travel outside the [place]. She states she has used ibuprofen, josh seltzer, and Theraflu with no symptom alleviation. The patient denies a history of smoking. Additionally, she denies any history of COPD, other lung problems, hypertension, or diabetes. She receives steroid injections for carpal tunnel. Additionally, she reports a history of pneumonia. The patient drinks a beer a week and denies drug use. Her last menstrual period was 4 years ago.     REVIEW OF SYSTEMS  Pertinent positives include fever, chills, shortness of breath, dry cough, nausea, diarrhea, abdominal cramping, and headache with temporal and maxillary sinus pressure.   Pertinent negatives include no vomiting or chest pain.    All other systems reviewed and negative. See HPI for further details.     PAST MEDICAL HISTORY   has a past medical history of Carpal tunnel syndrome and Plantar fasciitis of left foot.    SURGICAL HISTORY   has a past surgical history that includes tonsillectomy and other.    SOCIAL HISTORY  Social History     Tobacco Use   • Smoking status: Former Smoker     Packs/day: 0.00      "Types: Cigarettes   • Smokeless tobacco: Never Used   • Tobacco comment: quit 3 years ago.   Substance Use Topics   • Alcohol use: Yes     Comment: occasional   • Drug use: No      Social History     Substance and Sexual Activity   Drug Use No     FAMILY HISTORY  Family History   Problem Relation Age of Onset   • Arthritis Mother    • Cancer Maternal Grandmother      CURRENT MEDICATIONS  Home Medications     Reviewed by Ganesh Pennington R.N. (Registered Nurse) on 01/17/20 at 0908  Med List Status: Partial   Medication Last Dose Status   acetaminophen (TYLENOL) 325 MG TABS  Active   conjugated estrogen (PREMARIN) 0.625 MG/GM Cream  Active   ibuprofen (MOTRIN) 600 MG Tab 1/17/2020 Active   Multiple Vitamin (DAILY VITAMINS PO) 1/16/2020 Active   omeprazole (PRILOSEC) 20 MG delayed-release capsule  Active              ALLERGIES  Allergies   Allergen Reactions   • Pcn [Penicillins]      PHYSICAL EXAM  VITAL SIGNS: /70   Pulse (!) 110   Temp (!) 38.2 °C (100.7 °F) (Oral)   Resp 16   Ht 1.575 m (5' 2\")   Wt 89.9 kg (198 lb 3.1 oz)   LMP 07/07/2016 (Exact Date)   SpO2 93%   BMI 36.25 kg/m²     Nursing note and vitals reviewed.  Constitutional: Well-developed and well-nourished. No distress.   HENT: Head is normocephalic and atraumatic. Oropharynx is clear and moist without exudate or erythema.   Eyes: Pupils are equal, round, and reactive to light. Conjunctiva are normal.   Cardiovascular: Tachycardic rate and regular rhythm. No murmur heard. No rubs or gallops. Normal radial pulses.  Pulmonary/Chest: Breath sounds diminished. No wheezes or rales.   Abdominal: Soft and non-tender. No distention    Musculoskeletal: Extremities exhibit normal range of motion without edema or tenderness.   Neurological: Awake, alert and oriented to person, place, and time. No focal deficits noted.  Skin: Skin is warm and dry. No rash.   Psychiatric: Normal mood and affect. Appropriate for clinical situation.    DIAGNOSTIC " STUDIES / PROCEDURES    LABS  Results for orders placed or performed during the hospital encounter of 01/17/20   Influenza A/B By PCR (Adult - Flu Only)   Result Value Ref Range    Influenza virus A RNA Negative Negative    Influenza virus B, PCR Negative Negative   CBC WITH DIFFERENTIAL   Result Value Ref Range    WBC 11.9 (H) 4.8 - 10.8 K/uL    RBC 4.10 (L) 4.20 - 5.40 M/uL    Hemoglobin 13.3 12.0 - 16.0 g/dL    Hematocrit 37.5 37.0 - 47.0 %    MCV 91.5 81.4 - 97.8 fL    MCH 32.4 27.0 - 33.0 pg    MCHC 35.5 (H) 33.6 - 35.0 g/dL    RDW 41.7 35.9 - 50.0 fL    Platelet Count 238 164 - 446 K/uL    MPV 9.9 9.0 - 12.9 fL    Neutrophils-Polys 87.00 (H) 44.00 - 72.00 %    Lymphocytes 6.80 (L) 22.00 - 41.00 %    Monocytes 5.20 0.00 - 13.40 %    Eosinophils 0.00 0.00 - 6.90 %    Basophils 0.20 0.00 - 1.80 %    Immature Granulocytes 0.80 0.00 - 0.90 %    Nucleated RBC 0.00 /100 WBC    Neutrophils (Absolute) 10.34 (H) 2.00 - 7.15 K/uL    Lymphs (Absolute) 0.81 (L) 1.00 - 4.80 K/uL    Monos (Absolute) 0.62 0.00 - 0.85 K/uL    Eos (Absolute) 0.00 0.00 - 0.51 K/uL    Baso (Absolute) 0.02 0.00 - 0.12 K/uL    Immature Granulocytes (abs) 0.10 0.00 - 0.11 K/uL    NRBC (Absolute) 0.00 K/uL   BASIC METABOLIC PANEL   Result Value Ref Range    Sodium 130 (L) 135 - 145 mmol/L    Potassium 3.1 (L) 3.6 - 5.5 mmol/L    Chloride 96 96 - 112 mmol/L    Co2 21 20 - 33 mmol/L    Glucose 115 (H) 65 - 99 mg/dL    Bun 9 8 - 22 mg/dL    Creatinine 0.75 0.50 - 1.40 mg/dL    Calcium 9.1 8.5 - 10.5 mg/dL    Anion Gap 13.0 (H) 0.0 - 11.9   LACTIC ACID   Result Value Ref Range    Lactic Acid 1.9 0.5 - 2.0 mmol/L   URINALYSIS CULTURE, IF INDICATED   Result Value Ref Range    Micro Urine Req Microscopic    ESTIMATED GFR   Result Value Ref Range    GFR If African American >60 >60 mL/min/1.73 m 2    GFR If Non African American >60 >60 mL/min/1.73 m 2     All labs reviewed by me.    RADIOLOGY  DX-CHEST-PORTABLE (1 VIEW)   Final Result      Right basilar  opacity is consistent with pneumonia. Follow-up imaging is recommended to document resolution.        The radiologist's interpretation of all radiological studies have been reviewed by me.    COURSE & MEDICAL DECISION MAKING  Nursing notes, VS, PMSFHx reviewed in chart.     Review of past medical records shows the patient was seen in the ED three years ago for pneumonia.       10:06 AM - Patient seen and examined at bedside. Patient will be treated with NS Infusion 1000 mL, Tylenol 650 mg, Motrin 600 mg, Zithromax 500 mg, and Rocephin 2 g. Intravenous fluids were administered for tachycardia. Ordered DX-Chest-Portable, Estimated GFR, CBC with Differential, BMP, Blood Culture, Lactic Acid, UA Culture, if Indicated, and Influenza A/B by PCR (Adult - Flu Only) to evaluate her symptoms. The differential diagnoses include but are not limited to: pneumonia versus influenza     10:54 AM - Patient will be treated with Zofran 4 mg.    11:11 AM - Ordered Urine Microscopic.    11:44 AM - Reviewed lab and radiology results, which showed patient is positive for pneumonia.    11:59 AM - On repeat evaluation, she feels the same. after fluids heart rate has improved. I updated patient on the findings and plan for hospitalization. She understands and agrees.    12:05 PM - Paged Hospitalist.    12:27 PM - I discussed the patient's case and the above findings with Dr. Matamoros (Hospitalist) who agrees to evaluate patient for hospitalization.     HYDRATION: Based on the patient's presentation of Tachycardia the patient was given IV fluids. IV Hydration was used because oral hydration was not adequate alone. Upon recheck following hydration, the patient was Improved.    DISPOSITION:  Patient will be hospitalized by Dr. Matamoros, Hospitalist, in guarded condition.     FINAL IMPRESSION  1. Pneumonia of right lower lobe due to infectious organism (HCC)          Deirdre CASTRO (Scribe), am scribing for, and in the presence of, Chet LEYVA  JACQUES Dao.    Electronically signed by: Deirdre Nava (Scribe), 1/17/2020    IChet M.D. personally performed the services described in this documentation, as scribed by Deirdre Nava in my presence, and it is both accurate and complete. C    The note accurately reflects work and decisions made by me.  Chet Dao M.D.  1/17/2020  5:13 PM

## 2020-01-17 NOTE — ED NOTES
called for added labs.  Tech stated they could be added to previously sent blood.  Duplicate orders for blood cultures placed by admitting.

## 2020-01-17 NOTE — H&P
Hospital Medicine History & Physical Note    Date of Service  1/17/2020    Primary Care Physician  Pcp Pt States None    Consultants  None    Code Status  FULL    Chief Complaint  Short of breath, cough, weak    History of Presenting Illness  55 y.o. female who presented 1/17/2020 with flu-like symptoms over last 4 days.  Here influenza screen was negative.  She had no travel nor known sick contacts.  She does have some occassional nausea, congestion, sore throat and mild cough.  She also states body aches and short of breath.    Review of Systems  Review of Systems   Constitutional: Positive for chills, diaphoresis and malaise/fatigue. Negative for fever.   HENT: Positive for congestion, sinus pain and sore throat.    Respiratory: Positive for cough and shortness of breath. Negative for sputum production.    Cardiovascular: Negative for chest pain and leg swelling.   Gastrointestinal: Positive for diarrhea and nausea. Negative for abdominal pain, constipation, melena and vomiting.   Genitourinary: Negative for dysuria and hematuria.   Musculoskeletal: Positive for myalgias. Negative for neck pain.   Skin: Negative for rash.   Neurological: Negative for dizziness, speech change and headaches.   Psychiatric/Behavioral: The patient is not nervous/anxious.        Past Medical History   has a past medical history of Carpal tunnel syndrome and Plantar fasciitis of left foot.    Surgical History   has a past surgical history that includes tonsillectomy; other; and other orthopedic surgery.     Family History  family history includes Arthritis in her mother; Cancer in her maternal grandmother.     Social History   reports that she has quit smoking. Her smoking use included cigarettes. She smoked 0.00 packs per day. She has never used smokeless tobacco. She reports current alcohol use. She reports that she does not use drugs.    Allergies  Allergies   Allergen Reactions   • Pcn [Penicillins]        Medications  Prior to  Admission Medications   Prescriptions Last Dose Informant Patient Reported? Taking?   Multiple Vitamin (DAILY VITAMINS PO) 1/16/2020 at Unknown time Patient Yes No   Sig: Take  by mouth.   acetaminophen (TYLENOL) 325 MG TABS  Patient Yes No   Sig: Take 650 mg by mouth every four hours as needed.   conjugated estrogen (PREMARIN) 0.625 MG/GM Cream  Patient Yes No   Sig: Insert 0.5 g in vagina 2X A WEEK.   ibuprofen (MOTRIN) 600 MG Tab 1/17/2020 at Unknown time  No No   Sig: Take 1 Tab by mouth every 6 hours as needed.   omeprazole (PRILOSEC) 20 MG delayed-release capsule   Yes Yes   Sig: Take 20 mg by mouth every day.      Facility-Administered Medications: None       Physical Exam  Temp:  [36.4 °C (97.5 °F)-38.2 °C (100.7 °F)] 36.9 °C (98.4 °F)  Pulse:  [] 107  Resp:  [16-20] 20  BP: ()/(49-76) 95/49  SpO2:  [90 %-96 %] 90 %    Physical Exam  Vitals signs reviewed.   Constitutional:       Appearance: Normal appearance. She is ill-appearing and diaphoretic.   HENT:      Head: Normocephalic and atraumatic.      Nose: Nose normal. No congestion.      Mouth/Throat:      Mouth: Mucous membranes are moist.      Pharynx: No oropharyngeal exudate.   Eyes:      General: No scleral icterus.        Right eye: No discharge.         Left eye: No discharge.      Extraocular Movements: Extraocular movements intact.      Conjunctiva/sclera: Conjunctivae normal.   Neck:      Musculoskeletal: No muscular tenderness.      Vascular: No carotid bruit.   Cardiovascular:      Rate and Rhythm: Normal rate and regular rhythm.      Pulses:           Radial pulses are 2+ on the right side and 2+ on the left side.        Dorsalis pedis pulses are 2+ on the right side and 2+ on the left side.      Heart sounds: No murmur.   Pulmonary:      Effort: Pulmonary effort is normal. No respiratory distress.      Breath sounds: Normal breath sounds. No wheezing or rales.   Abdominal:      General: Bowel sounds are normal. There is no  distension.      Palpations: Abdomen is soft.   Musculoskeletal:         General: No swelling or tenderness.   Lymphadenopathy:      Cervical: No cervical adenopathy.   Skin:     Coloration: Skin is not jaundiced or pale.   Neurological:      General: No focal deficit present.      Mental Status: She is alert and oriented to person, place, and time. Mental status is at baseline.      Cranial Nerves: No cranial nerve deficit.   Psychiatric:         Mood and Affect: Mood normal.         Behavior: Behavior normal.         Laboratory:  Recent Labs     01/17/20  1040   WBC 11.9*   RBC 4.10*   HEMOGLOBIN 13.3   HEMATOCRIT 37.5   MCV 91.5   MCH 32.4   MCHC 35.5*   RDW 41.7   PLATELETCT 238   MPV 9.9     Recent Labs     01/17/20  1040   SODIUM 130*   POTASSIUM 3.1*   CHLORIDE 96   CO2 21   GLUCOSE 115*   BUN 9   CREATININE 0.75   CALCIUM 9.1     Recent Labs     01/17/20  1040   GLUCOSE 115*     Recent Labs     01/17/20  1040   INR 1.03     No results for input(s): NTPROBNP in the last 72 hours.      No results for input(s): TROPONINT in the last 72 hours.    Urinalysis:    No results found     Imaging:  DX-CHEST-PORTABLE (1 VIEW)   Final Result      Right basilar opacity is consistent with pneumonia. Follow-up imaging is recommended to document resolution.            Assessment/Plan:  I anticipate this patient will require at least two midnights for appropriate medical management, necessitating inpatient admission.    Hypokalemia  Assessment & Plan  Check Mg  Replace K  Monitor labs    Hyponatremia  Assessment & Plan  States poor oral intake since feeling ill.  IV fluids.    Upper respiratory infection, acute  Assessment & Plan  Likely viral although CXR showing right lower lobe opacification.  Antitussives prn  Rocephin and azithromycin  Influenza negative        VTE prophylaxis: lovenox

## 2020-01-18 LAB
ANION GAP SERPL CALC-SCNC: 12 MMOL/L (ref 0–11.9)
BASOPHILS # BLD AUTO: 0 % (ref 0–1.8)
BASOPHILS # BLD: 0 K/UL (ref 0–0.12)
BUN SERPL-MCNC: 9 MG/DL (ref 8–22)
CALCIUM SERPL-MCNC: 8.2 MG/DL (ref 8.5–10.5)
CHLORIDE SERPL-SCNC: 104 MMOL/L (ref 96–112)
CO2 SERPL-SCNC: 20 MMOL/L (ref 20–33)
CREAT SERPL-MCNC: 0.72 MG/DL (ref 0.5–1.4)
EOSINOPHIL # BLD AUTO: 0 K/UL (ref 0–0.51)
EOSINOPHIL NFR BLD: 0 % (ref 0–6.9)
ERYTHROCYTE [DISTWIDTH] IN BLOOD BY AUTOMATED COUNT: 45.1 FL (ref 35.9–50)
GLUCOSE SERPL-MCNC: 102 MG/DL (ref 65–99)
HCT VFR BLD AUTO: 35.3 % (ref 37–47)
HGB BLD-MCNC: 12.3 G/DL (ref 12–16)
LACTATE BLD-SCNC: 1.1 MMOL/L (ref 0.5–2)
LYMPHOCYTES # BLD AUTO: 0.85 K/UL (ref 1–4.8)
LYMPHOCYTES NFR BLD: 9 % (ref 22–41)
MANUAL DIFF BLD: NORMAL
MCH RBC QN AUTO: 32.7 PG (ref 27–33)
MCHC RBC AUTO-ENTMCNC: 34.8 G/DL (ref 33.6–35)
MCV RBC AUTO: 93.9 FL (ref 81.4–97.8)
METAMYELOCYTES NFR BLD MANUAL: 1.8 %
MONOCYTES # BLD AUTO: 0.25 K/UL (ref 0–0.85)
MONOCYTES NFR BLD AUTO: 2.7 % (ref 0–13.4)
MORPHOLOGY BLD-IMP: NORMAL
NEUTROPHILS # BLD AUTO: 8.13 K/UL (ref 2–7.15)
NEUTROPHILS NFR BLD: 76.6 % (ref 44–72)
NEUTS BAND NFR BLD MANUAL: 9.9 % (ref 0–10)
NRBC # BLD AUTO: 0 K/UL
NRBC BLD-RTO: 0 /100 WBC
PLATELET # BLD AUTO: 215 K/UL (ref 164–446)
PLATELET BLD QL SMEAR: NORMAL
PMV BLD AUTO: 9.8 FL (ref 9–12.9)
POTASSIUM SERPL-SCNC: 3.8 MMOL/L (ref 3.6–5.5)
RBC # BLD AUTO: 3.76 M/UL (ref 4.2–5.4)
RBC BLD AUTO: NORMAL
SODIUM SERPL-SCNC: 136 MMOL/L (ref 135–145)
WBC # BLD AUTO: 9.4 K/UL (ref 4.8–10.8)

## 2020-01-18 PROCEDURE — 85027 COMPLETE CBC AUTOMATED: CPT

## 2020-01-18 PROCEDURE — 36415 COLL VENOUS BLD VENIPUNCTURE: CPT

## 2020-01-18 PROCEDURE — 700102 HCHG RX REV CODE 250 W/ 637 OVERRIDE(OP): Performed by: NURSE PRACTITIONER

## 2020-01-18 PROCEDURE — 99232 SBSQ HOSP IP/OBS MODERATE 35: CPT | Performed by: STUDENT IN AN ORGANIZED HEALTH CARE EDUCATION/TRAINING PROGRAM

## 2020-01-18 PROCEDURE — 83605 ASSAY OF LACTIC ACID: CPT

## 2020-01-18 PROCEDURE — 700102 HCHG RX REV CODE 250 W/ 637 OVERRIDE(OP): Performed by: HOSPITALIST

## 2020-01-18 PROCEDURE — A9270 NON-COVERED ITEM OR SERVICE: HCPCS | Performed by: HOSPITALIST

## 2020-01-18 PROCEDURE — A9270 NON-COVERED ITEM OR SERVICE: HCPCS | Performed by: NURSE PRACTITIONER

## 2020-01-18 PROCEDURE — 80048 BASIC METABOLIC PNL TOTAL CA: CPT

## 2020-01-18 PROCEDURE — 770006 HCHG ROOM/CARE - MED/SURG/GYN SEMI*

## 2020-01-18 PROCEDURE — 700111 HCHG RX REV CODE 636 W/ 250 OVERRIDE (IP): Performed by: HOSPITALIST

## 2020-01-18 PROCEDURE — 700105 HCHG RX REV CODE 258: Performed by: HOSPITALIST

## 2020-01-18 PROCEDURE — 85007 BL SMEAR W/DIFF WBC COUNT: CPT

## 2020-01-18 RX ORDER — IBUPROFEN 400 MG/1
600 TABLET ORAL EVERY 6 HOURS PRN
Status: DISCONTINUED | OUTPATIENT
Start: 2020-01-18 | End: 2020-01-22 | Stop reason: HOSPADM

## 2020-01-18 RX ADMIN — SODIUM CHLORIDE, POTASSIUM CHLORIDE, SODIUM LACTATE AND CALCIUM CHLORIDE: 600; 310; 30; 20 INJECTION, SOLUTION INTRAVENOUS at 01:55

## 2020-01-18 RX ADMIN — CEFTRIAXONE SODIUM 2 G: 2 INJECTION, POWDER, FOR SOLUTION INTRAMUSCULAR; INTRAVENOUS at 05:57

## 2020-01-18 RX ADMIN — BENZONATATE 100 MG: 100 CAPSULE ORAL at 06:03

## 2020-01-18 RX ADMIN — ENOXAPARIN SODIUM 40 MG: 100 INJECTION SUBCUTANEOUS at 05:57

## 2020-01-18 RX ADMIN — AZITHROMYCIN 500 MG: 250 TABLET, FILM COATED ORAL at 05:56

## 2020-01-18 RX ADMIN — IBUPROFEN 600 MG: 400 TABLET, FILM COATED ORAL at 21:32

## 2020-01-18 RX ADMIN — ACETAMINOPHEN 650 MG: 325 TABLET, FILM COATED ORAL at 00:37

## 2020-01-18 RX ADMIN — Medication 1 LOZENGE: at 11:49

## 2020-01-18 RX ADMIN — IBUPROFEN 600 MG: 400 TABLET, FILM COATED ORAL at 11:47

## 2020-01-18 RX ADMIN — ACETAMINOPHEN 650 MG: 325 TABLET, FILM COATED ORAL at 08:01

## 2020-01-18 RX ADMIN — OMEPRAZOLE 20 MG: 20 CAPSULE, DELAYED RELEASE ORAL at 05:56

## 2020-01-18 RX ADMIN — SODIUM CHLORIDE, POTASSIUM CHLORIDE, SODIUM LACTATE AND CALCIUM CHLORIDE: 600; 310; 30; 20 INJECTION, SOLUTION INTRAVENOUS at 23:51

## 2020-01-18 ASSESSMENT — ENCOUNTER SYMPTOMS
COUGH: 1
WEIGHT LOSS: 1
DIAPHORESIS: 1
SPUTUM PRODUCTION: 0
FEVER: 1
TINGLING: 0
HEARTBURN: 0
FOCAL WEAKNESS: 0
SORE THROAT: 1
NAUSEA: 1
DIZZINESS: 0
VOMITING: 0
CHILLS: 1
MYALGIAS: 0
HEMOPTYSIS: 0
TREMORS: 0
HEADACHES: 1
SHORTNESS OF BREATH: 1
BACK PAIN: 0
WHEEZING: 0
PALPITATIONS: 0
ABDOMINAL PAIN: 0
FLANK PAIN: 0

## 2020-01-18 NOTE — PROGRESS NOTES
Bedside report received. Pt is A&Ox4. Pt complains of headache and pain 6/10. Pt assessed for pain regularly and medicated PRN per MAR with ibuprofen. Pt also has cough drops ordered PRN q 2 hrs.  POC discussed with pt; all questions answered at this time.

## 2020-01-18 NOTE — PROGRESS NOTES
Hospitalist Daily Progress Note    Chief Complaint   Patient presents with   • Flu Like Symptoms   • Body Aches   • Headache   • Shortness of Breath    Patient was seen and evaluated today for cough and shortness of breath.   Hospital Course     ER Course  Glory Kessler is a 55 y.o. female who presents to the Emergency Department for fever and chills that began 4 days ago. In the last 24 hours, her symptoms have worsened and she has begun to experience shortness of breath with a dry cough. The patient reports mild nausea, diarrhea, mild abdominal cramping, and headache with temporal and maxillary sinus pressure. She denies vomiting or chest pain. She states she received a flu vaccine this year. Additionally, she denies any sick contact or travel outside the [place]. She states she has used ibuprofen, josh seltzer, and Theraflu with no symptom alleviation. The patient denies a history of smoking. Additionally, she denies any history of COPD, other lung problems, hypertension, or diabetes. She receives steroid injections for carpal tunnel. Additionally, she reports a history of pneumonia. The patient drinks a beer a week and denies drug use. Her last menstrual period was 4 years ago.     Admission Day Course  55 y.o. female who presented 1/17/2020 with flu-like symptoms over last 4 days.  Here influenza screen was negative.  She had no travel nor known sick contacts.  She does have some occassional nausea, congestion, sore throat and mild cough.  She also states body aches and short of breath.       Consultants/Specialty:  None Procedures During Hospitalization:  None        Code Status: Full Code   VTE prophylaxis: Lovenox diet: Diet Order Regular  Hospital Day: 1     Disposition: Continue IP pending clinical course      Pat. Class: Inpatient    Assessment/Plan  * Upper respiratory infection, acute  Assessment & Plan  1/17 (admission) : Likely viral although CXR showing right lower lobe opacification.Antitussives prn.  Rocephin and azithromycin. Influenza negative  1/18: Continue antibiotics, fluids symptom management.  Experienced some episodes of hypotension which resolved with LR bolus.  Blood cultures no growth to date.  Procal was elevated.  Leukocytosis has been resolved.  Continue to monitor blood cultures and adjust antibiotics accordingly.      Hyponatremia  Assessment & Plan  1/17(admission): States poor oral intake since feeling ill. IV fluids.  1/18: Resolving.  Likely related to dehydration.  Continue to monitor with BMP    Hypokalemia  Assessment & Plan  1/17 (admission): Check Mg. Replace K. Monitor labs  1/18: Resolved.  Continue to monitor with BMP       Laboratory  Results from last 7 days   Lab Units 01/18/20  0032 01/17/20  1040   WBC 1501 K/uL 9.4 11.9*   HGB 1503 g/dL 12.3 13.3   HCT 1504 % 35.3* 37.5   PLATELET COUNT 1518 K/uL 215 238    Results from last 7 days   Lab Units 01/18/20  0032 01/17/20  1040   SODIUM 101 mmol/L 136 130*   POTASSIUM 102 mmol/L 3.8 3.1*   CHLORIDE 103 mmol/L 104 96   CO2 104 mmol/L 20 21   ANION GAP ANION  12.0* 13.0*    mg/dL 9 9   CREATININE 109 mg/dL 0.72 0.75   CALCIUM 105 mg/dL 8.2* 9.1   GLUCOSE 112 mg/dL 102* 115*   IF EDGARDO AMER 343656 mL/min/1.73 m 2 >60 >60   IF NON AFRICAN AMER [id number] mL/min/1.73 m 2 >60 >60   MAGNESIUM 561 mg/dL  --  1.5             Intake/Output Summary (Last 24 hours) at 1/18/2020 1010  Last data filed at 1/18/2020 0830  Gross per 24 hour   Intake 150 ml   Output --   Net 150 ml    Vital Signs  Temp:  [36.4 °C (97.5 °F)-38.1 °C (100.5 °F)] 38.1 °C (100.5 °F)  Pulse:  [] 112  Resp:  [18-20] 19  BP: ()/(49-76) 116/58  SpO2:  [90 %-96 %] 95 %   Review of Systems  Review of Systems   Constitutional: Positive for chills, diaphoresis, fever, malaise/fatigue and weight loss.   HENT: Positive for congestion and sore throat.    Respiratory: Positive for cough and shortness of breath. Negative for hemoptysis, sputum production and wheezing.     Cardiovascular: Negative for chest pain, palpitations and leg swelling.   Gastrointestinal: Positive for nausea. Negative for abdominal pain, heartburn and vomiting.   Genitourinary: Negative for dysuria, flank pain, frequency, hematuria and urgency.   Musculoskeletal: Negative for back pain and myalgias.   Neurological: Positive for headaches. Negative for dizziness, tingling, tremors and focal weakness.    Physical Exam  Physical Exam   Constitutional: She is oriented to person, place, and time. She appears distressed.   Neck: Normal range of motion.   Cardiovascular: Regular rhythm and intact distal pulses. Tachycardia present.   Pulmonary/Chest: She is in respiratory distress. She has rales. She exhibits no tenderness.   Abdominal: Soft. Bowel sounds are normal. She exhibits no distension. There is no tenderness.   Musculoskeletal:         General: No edema.   Neurological: She is alert and oriented to person, place, and time.   Skin: Skin is warm and dry. She is not diaphoretic. There is pallor.   Psychiatric: She has a normal mood and affect. Her behavior is normal.   Nursing note and vitals reviewed.       Medications  omeprazole, 20 mg, Oral, DAILY  senna-docusate, 2 Tab, Oral, BID  enoxaparin, 40 mg, Subcutaneous, DAILY  cefTRIAXone (ROCEPHIN) IV, 2 g, Intravenous, Q24HRS  azithromycin, 500 mg, Oral, DAILY       Medications were reviewed today.     Pertinent Imaging Reviewed:   1 -

## 2020-01-18 NOTE — CARE PLAN
Problem: Communication  Goal: The ability to communicate needs accurately and effectively will improve  Outcome: PROGRESSING AS EXPECTED  Note:   Pt is encouraged to voice feelings, therapeutic communication was utilized.      Problem: Knowledge Deficit  Goal: Knowledge of disease process/condition, treatment plan, diagnostic tests, and medications will improve  Outcome: PROGRESSING AS EXPECTED  Note:   Pt educated regarding plan of care and medications. All questions answered.

## 2020-01-18 NOTE — ASSESSMENT & PLAN NOTE
1/21-1/22 - stable  1/20 - I ordered 20 mEq and 40 mEq of potassium replacement today.  1/18-1/19 : Resolved.  Continue to monitor with BMP  1/17 (admission): Check Mg. Replace K. Monitor labs

## 2020-01-18 NOTE — PROGRESS NOTES
Overnight, oxygen monitored. Continuous pulse ox in place. Pt on 2L. Tylenol given x1 for headache. Pt remains A&Ox4. Safety precautions maintained. Will continue to monitor and report to the oncoming nurse.

## 2020-01-18 NOTE — ASSESSMENT & PLAN NOTE
1/22 - while on O2, did not meet criteria for home O2 services, medically stable for discharge.  Able to maintain >92% with deep breathing.  Patient stated she'll buy a home O2 sensor and rest when/if it drops  1/21 - improving, O2 demand down.  1/20 - Is on chest x-ray, symptom, vital sign including fever this afternoon, most likely community-acquired pneumonia  Patient will been on ceftriaxone for admission empirically.  1/19: leukocytosis worse from yesterday. However, patient reports WOB improved and looks clinically improved. Blood cx no growth to date. Urine cx + for Group B strep, which may be contaminate. Will wait for s/s and adjust Abx accordingly. Continue supportive care and monitor.  1/18: Continue antibiotics, fluids symptom management.  Experienced some episodes of hypotension which resolved with LR bolus.  Blood cultures no growth to date.  Procal was elevated.  Leukocytosis has been resolved.  Continue to monitor blood cultures and adjust antibiotics accordingly  1/17 - adm - I added azithromycin for next 5 days for atypical coverage.1/17 (admission) : Likely viral although CXR showing right lower lobe opacification.Antitussives prn. Rocephin and azithromycin. Influenza negative  .

## 2020-01-18 NOTE — ASSESSMENT & PLAN NOTE
1/21-1/22 - Resolved  1/20 - Suspect due to poor oral intake due to her symptoms. This has resolved  1/18-1/19: Resolving.  Likely related to dehydration.  Continue to monitor with BMP  1/17(admission): States poor oral intake since feeling ill. IV fluids.

## 2020-01-18 NOTE — CARE PLAN
Problem: Safety  Goal: Will remain free from injury  Outcome: PROGRESSING AS EXPECTED     Problem: Venous Thromboembolism (VTW)/Deep Vein Thrombosis (DVT) Prevention:  Goal: Patient will participate in Venous Thrombosis (VTE)/Deep Vein Thrombosis (DVT)Prevention Measures  Outcome: PROGRESSING AS EXPECTED     Problem: Respiratory:  Goal: Respiratory status will improve  Outcome: PROGRESSING AS EXPECTED

## 2020-01-18 NOTE — PROGRESS NOTES
2 RN skin check complete with Brayden GUEVARA  Devices in place : None.  Skin assessed under devices N/A.  Confirmed pressure ulcers found on N/A.  New potential pressure ulcers noted on N/A. Wound consult placed No.  The following interventions in place: pressure redistributing mattress

## 2020-01-18 NOTE — PROGRESS NOTES
"Patient received from ER in 620-2. Pleasant and cooperative.  and daughter at bedside. C/O \"bodyaches\" Tylenol provided. VSS. Call light in reach and understands its use. IV fluids infusing,   "

## 2020-01-19 ENCOUNTER — APPOINTMENT (OUTPATIENT)
Dept: RADIOLOGY | Facility: MEDICAL CENTER | Age: 55
DRG: 871 | End: 2020-01-19
Attending: STUDENT IN AN ORGANIZED HEALTH CARE EDUCATION/TRAINING PROGRAM
Payer: COMMERCIAL

## 2020-01-19 PROBLEM — G47.34 NOCTURNAL OXYGEN DESATURATION: Status: ACTIVE | Noted: 2020-01-19

## 2020-01-19 PROBLEM — I48.91 ATRIAL FIBRILLATION WITH RVR (HCC): Status: ACTIVE | Noted: 2020-01-19

## 2020-01-19 LAB
ANION GAP SERPL CALC-SCNC: 10 MMOL/L (ref 0–11.9)
BACTERIA UR CULT: ABNORMAL
BACTERIA UR CULT: ABNORMAL
BUN SERPL-MCNC: 8 MG/DL (ref 8–22)
CALCIUM SERPL-MCNC: 8.7 MG/DL (ref 8.5–10.5)
CHLORIDE SERPL-SCNC: 106 MMOL/L (ref 96–112)
CO2 SERPL-SCNC: 22 MMOL/L (ref 20–33)
CREAT SERPL-MCNC: 0.67 MG/DL (ref 0.5–1.4)
CRP SERPL HS-MCNC: 43.05 MG/DL (ref 0–0.75)
ERYTHROCYTE [DISTWIDTH] IN BLOOD BY AUTOMATED COUNT: 47.9 FL (ref 35.9–50)
GLUCOSE SERPL-MCNC: 100 MG/DL (ref 65–99)
HCT VFR BLD AUTO: 37.3 % (ref 37–47)
HGB BLD-MCNC: 12.5 G/DL (ref 12–16)
MCH RBC QN AUTO: 32.2 PG (ref 27–33)
MCHC RBC AUTO-ENTMCNC: 33.5 G/DL (ref 33.6–35)
MCV RBC AUTO: 96.1 FL (ref 81.4–97.8)
PLATELET # BLD AUTO: 267 K/UL (ref 164–446)
PMV BLD AUTO: 9.7 FL (ref 9–12.9)
POTASSIUM SERPL-SCNC: 3.7 MMOL/L (ref 3.6–5.5)
RBC # BLD AUTO: 3.88 M/UL (ref 4.2–5.4)
SIGNIFICANT IND 70042: ABNORMAL
SITE SITE: ABNORMAL
SODIUM SERPL-SCNC: 138 MMOL/L (ref 135–145)
SOURCE SOURCE: ABNORMAL
WBC # BLD AUTO: 11 K/UL (ref 4.8–10.8)

## 2020-01-19 PROCEDURE — 84484 ASSAY OF TROPONIN QUANT: CPT

## 2020-01-19 PROCEDURE — 99233 SBSQ HOSP IP/OBS HIGH 50: CPT | Performed by: STUDENT IN AN ORGANIZED HEALTH CARE EDUCATION/TRAINING PROGRAM

## 2020-01-19 PROCEDURE — 93005 ELECTROCARDIOGRAM TRACING: CPT | Performed by: STUDENT IN AN ORGANIZED HEALTH CARE EDUCATION/TRAINING PROGRAM

## 2020-01-19 PROCEDURE — 700111 HCHG RX REV CODE 636 W/ 250 OVERRIDE (IP): Performed by: HOSPITALIST

## 2020-01-19 PROCEDURE — 84145 PROCALCITONIN (PCT): CPT

## 2020-01-19 PROCEDURE — 700111 HCHG RX REV CODE 636 W/ 250 OVERRIDE (IP): Performed by: STUDENT IN AN ORGANIZED HEALTH CARE EDUCATION/TRAINING PROGRAM

## 2020-01-19 PROCEDURE — 700102 HCHG RX REV CODE 250 W/ 637 OVERRIDE(OP): Performed by: STUDENT IN AN ORGANIZED HEALTH CARE EDUCATION/TRAINING PROGRAM

## 2020-01-19 PROCEDURE — 80053 COMPREHEN METABOLIC PANEL: CPT

## 2020-01-19 PROCEDURE — 700102 HCHG RX REV CODE 250 W/ 637 OVERRIDE(OP): Performed by: HOSPITALIST

## 2020-01-19 PROCEDURE — 80048 BASIC METABOLIC PNL TOTAL CA: CPT

## 2020-01-19 PROCEDURE — A9270 NON-COVERED ITEM OR SERVICE: HCPCS | Performed by: NURSE PRACTITIONER

## 2020-01-19 PROCEDURE — 700102 HCHG RX REV CODE 250 W/ 637 OVERRIDE(OP): Performed by: NURSE PRACTITIONER

## 2020-01-19 PROCEDURE — 36415 COLL VENOUS BLD VENIPUNCTURE: CPT

## 2020-01-19 PROCEDURE — A9270 NON-COVERED ITEM OR SERVICE: HCPCS | Performed by: HOSPITALIST

## 2020-01-19 PROCEDURE — 770020 HCHG ROOM/CARE - TELE (206)

## 2020-01-19 PROCEDURE — 85027 COMPLETE CBC AUTOMATED: CPT

## 2020-01-19 PROCEDURE — 86140 C-REACTIVE PROTEIN: CPT

## 2020-01-19 PROCEDURE — 71045 X-RAY EXAM CHEST 1 VIEW: CPT

## 2020-01-19 PROCEDURE — 83735 ASSAY OF MAGNESIUM: CPT

## 2020-01-19 PROCEDURE — A9270 NON-COVERED ITEM OR SERVICE: HCPCS | Performed by: STUDENT IN AN ORGANIZED HEALTH CARE EDUCATION/TRAINING PROGRAM

## 2020-01-19 PROCEDURE — 700105 HCHG RX REV CODE 258: Performed by: HOSPITALIST

## 2020-01-19 RX ORDER — FUROSEMIDE 10 MG/ML
20 INJECTION INTRAMUSCULAR; INTRAVENOUS
Status: COMPLETED | OUTPATIENT
Start: 2020-01-19 | End: 2020-01-20

## 2020-01-19 RX ADMIN — SENNOSIDES AND DOCUSATE SODIUM 2 TABLET: 8.6; 5 TABLET ORAL at 04:48

## 2020-01-19 RX ADMIN — CEFTRIAXONE SODIUM 2 G: 2 INJECTION, POWDER, FOR SOLUTION INTRAMUSCULAR; INTRAVENOUS at 04:48

## 2020-01-19 RX ADMIN — FUROSEMIDE 20 MG: 10 INJECTION, SOLUTION INTRAMUSCULAR; INTRAVENOUS at 20:08

## 2020-01-19 RX ADMIN — AZITHROMYCIN 500 MG: 250 TABLET, FILM COATED ORAL at 04:47

## 2020-01-19 RX ADMIN — ACETAMINOPHEN 650 MG: 325 TABLET, FILM COATED ORAL at 17:04

## 2020-01-19 RX ADMIN — OMEPRAZOLE 20 MG: 20 CAPSULE, DELAYED RELEASE ORAL at 04:47

## 2020-01-19 RX ADMIN — IBUPROFEN 600 MG: 400 TABLET, FILM COATED ORAL at 04:47

## 2020-01-19 RX ADMIN — METOPROLOL TARTRATE 12.5 MG: 25 TABLET, FILM COATED ORAL at 20:07

## 2020-01-19 ASSESSMENT — ENCOUNTER SYMPTOMS
TREMORS: 0
WEIGHT LOSS: 0
DIAPHORESIS: 0
ABDOMINAL PAIN: 0
FEVER: 0
SPUTUM PRODUCTION: 0
MYALGIAS: 0
TINGLING: 0
NAUSEA: 1
VOMITING: 0
WEAKNESS: 0
WHEEZING: 0
BACK PAIN: 0
FOCAL WEAKNESS: 0
CHILLS: 0
SHORTNESS OF BREATH: 0
HEARTBURN: 0
STRIDOR: 0
HEMOPTYSIS: 0
DIZZINESS: 0
SORE THROAT: 1
COUGH: 1
SINUS PAIN: 0
NECK PAIN: 0
FLANK PAIN: 0
PALPITATIONS: 0
HEADACHES: 0

## 2020-01-19 NOTE — PROGRESS NOTES
Hospitalist Daily Progress Note    Chief Complaint   Patient presents with   • Flu Like Symptoms   • Body Aches   • Headache   • Shortness of Breath    Patient was seen and evaluated today for cough and shortness of breath.   Hospital Course     ER Course  Glory Kessler is a 55 y.o. female who presents to the Emergency Department for fever and chills that began 4 days ago. In the last 24 hours, her symptoms have worsened and she has begun to experience shortness of breath with a dry cough. The patient reports mild nausea, diarrhea, mild abdominal cramping, and headache with temporal and maxillary sinus pressure. She denies vomiting or chest pain. She states she received a flu vaccine this year. Additionally, she denies any sick contact or travel outside the [place]. She states she has used ibuprofen, josh seltzer, and Theraflu with no symptom alleviation. The patient denies a history of smoking. Additionally, she denies any history of COPD, other lung problems, hypertension, or diabetes. She receives steroid injections for carpal tunnel. Additionally, she reports a history of pneumonia. The patient drinks a beer a week and denies drug use. Her last menstrual period was 4 years ago.     Admission Day Course  55 y.o. female who presented 1/17/2020 with flu-like symptoms over last 4 days.  Here influenza screen was negative.  She had no travel nor known sick contacts.  She does have some occassional nausea, congestion, sore throat and mild cough.  She also states body aches and short of breath.    Interval Problem update  1/18: Hypotension improved with IVF. Blood Cultures NGTD. Influenza neg. Procal minimally elevated. Suspect this may be r/t PNA, continue IV antibiotics and monitor.     1/19: WBC increased from yesterday at 11. Still with intermittent hypotensive episode, but afebrile. However,patient asymptomatic and looks clinically improved. She also reports feeling significantly better and w/o SOB or dyspnea. Will  d/c IVF as patient is with adequate PO intake and appears euvolemic.     Consultants/Specialty:  None Procedures During Hospitalization:  None        Code Status: Full Code   VTE prophylaxis: Lovenox diet: Diet Order Regular  Hospital Day: 2     Disposition: Continue IP pending clinical course      Pat. Class: Inpatient    Assessment/Plan  * Upper respiratory infection, acute- (present on admission)  Assessment & Plan  1/17 (admission) : Likely viral although CXR showing right lower lobe opacification.Antitussives prn. Rocephin and azithromycin. Influenza negative  1/18: Continue antibiotics, fluids symptom management.  Experienced some episodes of hypotension which resolved with LR bolus.  Blood cultures no growth to date.  Procal was elevated.  Leukocytosis has been resolved.  Continue to monitor blood cultures and adjust antibiotics accordingly.  1/19: leukocytosis worse from yesterday. However, patient reports WOB improved and looks clinically improved. Blood cx no growth to date. Urine cx + for Group B strep, which may be contaminate. Will wait for s/s and adjust Abx accordingly. Continue supportive care and monitor.     Results from last 7 days   Lab Units 01/17/20  1040   PROCALICTONIN [id number] ng/mL 0.87*     Results from last 7 days   Lab Units 01/19/20  0829 01/18/20  0032 01/17/20  1040   WBC 1501 K/uL 11.0* 9.4 11.9*   PLATELET COUNT 1518 K/uL 267 215 238           Hyponatremia- (present on admission)  Assessment & Plan  1/17(admission): States poor oral intake since feeling ill. IV fluids.  1/18-1/19: Resolving.  Likely related to dehydration.  Continue to monitor with BMP  Results from last 7 days   Lab Units 01/19/20  0829 01/18/20  0032   SODIUM 101 mmol/L 138 136   POTASSIUM 102 mmol/L 3.7 3.8   CHLORIDE 103 mmol/L 106 104   CO2 104 mmol/L 22 20   GLUCOSE 112 mg/dL 100* 102*    mg/dL 8 9   CREATININE 109 mg/dL 0.67 0.72   CALCIUM 105 mg/dL 8.7 8.2*   ANION GAP ANION  10.0 12.0*          Nocturnal oxygen desaturation  Assessment & Plan  1/19: Experienced nocturnal desaturations overnight. May have undiagnosed JANEL? Will need to follow-up outpatient for formal diagnosis. Currently doing well on 2L O2. Monitor     Hypokalemia  Assessment & Plan  1/17 (admission): Check Mg. Replace K. Monitor labs  1/18-1/19 : Resolved.  Continue to monitor with BMP  Results from last 7 days   Lab Units 01/19/20  0829 01/18/20  0032 01/17/20  1040   POTASSIUM 102 mmol/L 3.7 3.8 3.1*   MAGNESIUM 561 mg/dL  --   --  1.5            Laboratory  Results from last 7 days   Lab Units 01/19/20  0829 01/18/20  0032   WBC 1501 K/uL 11.0* 9.4   HGB 1503 g/dL 12.5 12.3   HCT 1504 % 37.3 35.3*   PLATELET COUNT 1518 K/uL 267 215    Results from last 7 days   Lab Units 01/19/20  0829 01/18/20  0032 01/17/20  1040   SODIUM 101 mmol/L 138 136 130*   POTASSIUM 102 mmol/L 3.7 3.8 3.1*   CHLORIDE 103 mmol/L 106 104 96   CO2 104 mmol/L 22 20 21   ANION GAP ANION  10.0 12.0* 13.0*    mg/dL 8 9 9   CREATININE 109 mg/dL 0.67 0.72 0.75   CALCIUM 105 mg/dL 8.7 8.2* 9.1   GLUCOSE 112 mg/dL 100* 102* 115*   IF ED[employer] AMER [id number] mL/min/1.73 m 2 >60 >60 >60   IF NON [employer] AMER [id number] mL/min/1.73 m 2 >60 >60 >60   MAGNESIUM 561 mg/dL  --   --  1.5             Intake/Output Summary (Last 24 hours) at 1/19/2020 1230  Last data filed at 1/19/2020 1000  Gross per 24 hour   Intake 1540 ml   Output --   Net 1540 ml    Vital Signs  Temp:  [36.1 °C (97 °F)-37.4 °C (99.3 °F)] 37.1 °C (98.8 °F)  Pulse:  [66-96] 72  Resp:  [18-22] 19  BP: ()/(45-68) 95/68  SpO2:  [92 %-95 %] 95 %   Review of Systems  Review of Systems   Constitutional: Positive for malaise/fatigue. Negative for chills, diaphoresis, fever and weight loss.   HENT: Positive for congestion and sore throat. Negative for sinus pain.    Respiratory: Positive for cough (dry). Negative for hemoptysis, sputum production, shortness of breath, wheezing and stridor.     Cardiovascular: Negative for chest pain, palpitations and leg swelling.   Gastrointestinal: Positive for nausea. Negative for abdominal pain, heartburn and vomiting.   Genitourinary: Negative for dysuria, flank pain, frequency, hematuria and urgency.   Musculoskeletal: Negative for back pain, myalgias and neck pain.   Neurological: Negative for dizziness, tingling, tremors, focal weakness, weakness and headaches.    Physical Exam  Physical Exam   Constitutional: She is oriented to person, place, and time. She appears well-developed. No distress.   Neck: Normal range of motion.   Cardiovascular: Regular rhythm and intact distal pulses. Tachycardia present.   Pulmonary/Chest: She is in respiratory distress. She has no wheezes. She has rales. She exhibits no tenderness.   Abdominal: Soft. Bowel sounds are normal. She exhibits no distension. There is no tenderness.   Musculoskeletal:         General: No tenderness or edema.   Neurological: She is alert and oriented to person, place, and time.   Skin: Skin is warm and dry. She is not diaphoretic. No erythema. No pallor.   Psychiatric: She has a normal mood and affect. Her behavior is normal.   Nursing note and vitals reviewed.  noctur     Medications  omeprazole, 20 mg, Oral, DAILY  senna-docusate, 2 Tab, Oral, BID  cefTRIAXone (ROCEPHIN) IV, 2 g, Intravenous, Q24HRS       Medications were reviewed today.     Pertinent Imaging Reviewed:   1 -

## 2020-01-19 NOTE — CARE PLAN
Problem: Hyperinflation:  Goal: Prevent or improve atelectasis  Outcome: PROGRESSING AS EXPECTED

## 2020-01-19 NOTE — PROGRESS NOTES
Received report and assumed care of pt. Assessment complete on 2L O2 via NC. Pt A&OX4. Denies any pain or discomfort at this time. All pt needs met at this time. Safety precautions and hourly rounding in place.

## 2020-01-19 NOTE — CARE PLAN
Problem: Communication  Goal: The ability to communicate needs accurately and effectively will improve  Outcome: PROGRESSING AS EXPECTED  Intervention: Develop alternate methods of communication with patient and significant other/support system  Note:   Encourage pt to voice feelings     Problem: Safety  Goal: Will remain free from injury  Outcome: PROGRESSING AS EXPECTED  Intervention: Educate patient and significant other/support system about adaptive mobility strategies and safe transfers  Note:   Bed locked and in lowest position

## 2020-01-19 NOTE — ASSESSMENT & PLAN NOTE
1/19: Experienced nocturnal desaturations overnight. May have undiagnosed JANEL? Will need to follow-up outpatient for formal diagnosis. Currently doing well on 2L O2. Monitor

## 2020-01-19 NOTE — PROGRESS NOTES
Pt remains A&Ox4, on 2-3L oxygen. Pt on continuous pulse ox, desated to 84% a couple times while sleeping. Pt woken up, asked to take deep breaths. Pt denies SOB, only reported headache; PRN Ibuprofen given. Safety precautions maintained. Will continue to monitor and report to the oncoming nurse.

## 2020-01-20 PROBLEM — J18.9 COMMUNITY ACQUIRED PNEUMONIA: Status: ACTIVE | Noted: 2020-01-17

## 2020-01-20 PROBLEM — J96.01 ACUTE RESPIRATORY FAILURE WITH HYPOXIA (HCC): Status: ACTIVE | Noted: 2020-01-20

## 2020-01-20 PROBLEM — F41.9 ANXIETY: Status: ACTIVE | Noted: 2020-01-20

## 2020-01-20 LAB
ALBUMIN SERPL BCP-MCNC: 2.7 G/DL (ref 3.2–4.9)
ALBUMIN/GLOB SERPL: 1.1 G/DL
ALP SERPL-CCNC: 231 U/L (ref 30–99)
ALT SERPL-CCNC: 34 U/L (ref 2–50)
ANION GAP SERPL CALC-SCNC: 10 MMOL/L (ref 0–11.9)
ANION GAP SERPL CALC-SCNC: 7 MMOL/L (ref 0–11.9)
AST SERPL-CCNC: 27 U/L (ref 12–45)
BILIRUB SERPL-MCNC: 1.2 MG/DL (ref 0.1–1.5)
BUN SERPL-MCNC: 6 MG/DL (ref 8–22)
BUN SERPL-MCNC: 7 MG/DL (ref 8–22)
CALCIUM SERPL-MCNC: 8.2 MG/DL (ref 8.5–10.5)
CALCIUM SERPL-MCNC: 8.2 MG/DL (ref 8.5–10.5)
CHLORIDE SERPL-SCNC: 103 MMOL/L (ref 96–112)
CHLORIDE SERPL-SCNC: 104 MMOL/L (ref 96–112)
CO2 SERPL-SCNC: 22 MMOL/L (ref 20–33)
CO2 SERPL-SCNC: 23 MMOL/L (ref 20–33)
CREAT SERPL-MCNC: 0.7 MG/DL (ref 0.5–1.4)
CREAT SERPL-MCNC: 0.71 MG/DL (ref 0.5–1.4)
CRP SERPL HS-MCNC: 37.18 MG/DL (ref 0–0.75)
EKG IMPRESSION: NORMAL
EKG IMPRESSION: NORMAL
ERYTHROCYTE [DISTWIDTH] IN BLOOD BY AUTOMATED COUNT: 45.4 FL (ref 35.9–50)
GLOBULIN SER CALC-MCNC: 2.5 G/DL (ref 1.9–3.5)
GLUCOSE SERPL-MCNC: 120 MG/DL (ref 65–99)
GLUCOSE SERPL-MCNC: 125 MG/DL (ref 65–99)
HCT VFR BLD AUTO: 33.7 % (ref 37–47)
HGB BLD-MCNC: 11.8 G/DL (ref 12–16)
MAGNESIUM SERPL-MCNC: 1.6 MG/DL (ref 1.5–2.5)
MAGNESIUM SERPL-MCNC: 1.6 MG/DL (ref 1.5–2.5)
MCH RBC QN AUTO: 32.7 PG (ref 27–33)
MCHC RBC AUTO-ENTMCNC: 35 G/DL (ref 33.6–35)
MCV RBC AUTO: 93.4 FL (ref 81.4–97.8)
PLATELET # BLD AUTO: 287 K/UL (ref 164–446)
PMV BLD AUTO: 9.4 FL (ref 9–12.9)
POTASSIUM SERPL-SCNC: 3.5 MMOL/L (ref 3.6–5.5)
POTASSIUM SERPL-SCNC: 3.5 MMOL/L (ref 3.6–5.5)
PROCALCITONIN SERPL-MCNC: 0.8 NG/ML
PROCALCITONIN SERPL-MCNC: 0.98 NG/ML
PROT SERPL-MCNC: 5.2 G/DL (ref 6–8.2)
RBC # BLD AUTO: 3.61 M/UL (ref 4.2–5.4)
SODIUM SERPL-SCNC: 134 MMOL/L (ref 135–145)
SODIUM SERPL-SCNC: 135 MMOL/L (ref 135–145)
TROPONIN T SERPL-MCNC: 8 NG/L (ref 6–19)
WBC # BLD AUTO: 7.4 K/UL (ref 4.8–10.8)

## 2020-01-20 PROCEDURE — 700105 HCHG RX REV CODE 258: Performed by: STUDENT IN AN ORGANIZED HEALTH CARE EDUCATION/TRAINING PROGRAM

## 2020-01-20 PROCEDURE — 700102 HCHG RX REV CODE 250 W/ 637 OVERRIDE(OP): Performed by: HOSPITALIST

## 2020-01-20 PROCEDURE — 93010 ELECTROCARDIOGRAM REPORT: CPT | Mod: 76 | Performed by: INTERNAL MEDICINE

## 2020-01-20 PROCEDURE — 84145 PROCALCITONIN (PCT): CPT

## 2020-01-20 PROCEDURE — A9270 NON-COVERED ITEM OR SERVICE: HCPCS | Performed by: NURSE PRACTITIONER

## 2020-01-20 PROCEDURE — 83735 ASSAY OF MAGNESIUM: CPT

## 2020-01-20 PROCEDURE — 700102 HCHG RX REV CODE 250 W/ 637 OVERRIDE(OP): Performed by: NURSE PRACTITIONER

## 2020-01-20 PROCEDURE — 93005 ELECTROCARDIOGRAM TRACING: CPT | Performed by: INTERNAL MEDICINE

## 2020-01-20 PROCEDURE — 85027 COMPLETE CBC AUTOMATED: CPT

## 2020-01-20 PROCEDURE — 770006 HCHG ROOM/CARE - MED/SURG/GYN SEMI*

## 2020-01-20 PROCEDURE — 700105 HCHG RX REV CODE 258

## 2020-01-20 PROCEDURE — 93010 ELECTROCARDIOGRAM REPORT: CPT | Performed by: INTERNAL MEDICINE

## 2020-01-20 PROCEDURE — 80048 BASIC METABOLIC PNL TOTAL CA: CPT

## 2020-01-20 PROCEDURE — 700111 HCHG RX REV CODE 636 W/ 250 OVERRIDE (IP): Performed by: STUDENT IN AN ORGANIZED HEALTH CARE EDUCATION/TRAINING PROGRAM

## 2020-01-20 PROCEDURE — A9270 NON-COVERED ITEM OR SERVICE: HCPCS | Performed by: STUDENT IN AN ORGANIZED HEALTH CARE EDUCATION/TRAINING PROGRAM

## 2020-01-20 PROCEDURE — 700102 HCHG RX REV CODE 250 W/ 637 OVERRIDE(OP): Performed by: INTERNAL MEDICINE

## 2020-01-20 PROCEDURE — 86140 C-REACTIVE PROTEIN: CPT

## 2020-01-20 PROCEDURE — A9270 NON-COVERED ITEM OR SERVICE: HCPCS | Performed by: HOSPITALIST

## 2020-01-20 PROCEDURE — 99233 SBSQ HOSP IP/OBS HIGH 50: CPT | Performed by: INTERNAL MEDICINE

## 2020-01-20 PROCEDURE — 36415 COLL VENOUS BLD VENIPUNCTURE: CPT

## 2020-01-20 PROCEDURE — A9270 NON-COVERED ITEM OR SERVICE: HCPCS | Performed by: INTERNAL MEDICINE

## 2020-01-20 PROCEDURE — 700111 HCHG RX REV CODE 636 W/ 250 OVERRIDE (IP): Performed by: HOSPITALIST

## 2020-01-20 PROCEDURE — 700105 HCHG RX REV CODE 258: Performed by: HOSPITALIST

## 2020-01-20 PROCEDURE — 700111 HCHG RX REV CODE 636 W/ 250 OVERRIDE (IP): Performed by: INTERNAL MEDICINE

## 2020-01-20 PROCEDURE — 700102 HCHG RX REV CODE 250 W/ 637 OVERRIDE(OP): Performed by: STUDENT IN AN ORGANIZED HEALTH CARE EDUCATION/TRAINING PROGRAM

## 2020-01-20 RX ORDER — AZITHROMYCIN 250 MG/1
500 TABLET, FILM COATED ORAL DAILY
Status: COMPLETED | OUTPATIENT
Start: 2020-01-21 | End: 2020-01-21

## 2020-01-20 RX ORDER — POTASSIUM CHLORIDE 20 MEQ/1
40 TABLET, EXTENDED RELEASE ORAL ONCE
Status: COMPLETED | OUTPATIENT
Start: 2020-01-20 | End: 2020-01-20

## 2020-01-20 RX ORDER — MAGNESIUM SULFATE HEPTAHYDRATE 40 MG/ML
2 INJECTION, SOLUTION INTRAVENOUS ONCE
Status: COMPLETED | OUTPATIENT
Start: 2020-01-20 | End: 2020-01-20

## 2020-01-20 RX ORDER — POTASSIUM CHLORIDE 7.45 MG/ML
10 INJECTION INTRAVENOUS
Status: COMPLETED | OUTPATIENT
Start: 2020-01-20 | End: 2020-01-20

## 2020-01-20 RX ORDER — ALPRAZOLAM 0.5 MG/1
0.5 TABLET ORAL ONCE
Status: COMPLETED | OUTPATIENT
Start: 2020-01-20 | End: 2020-01-20

## 2020-01-20 RX ORDER — PREDNISONE 50 MG/1
50 TABLET ORAL DAILY
Status: DISCONTINUED | OUTPATIENT
Start: 2020-01-20 | End: 2020-01-22 | Stop reason: HOSPADM

## 2020-01-20 RX ORDER — SODIUM CHLORIDE 9 MG/ML
INJECTION, SOLUTION INTRAVENOUS
Status: COMPLETED
Start: 2020-01-20 | End: 2020-01-20

## 2020-01-20 RX ADMIN — MAGNESIUM SULFATE HEPTAHYDRATE 2 G: 40 INJECTION, SOLUTION INTRAVENOUS at 09:13

## 2020-01-20 RX ADMIN — FUROSEMIDE 20 MG: 10 INJECTION, SOLUTION INTRAMUSCULAR; INTRAVENOUS at 15:40

## 2020-01-20 RX ADMIN — PREDNISONE 50 MG: 50 TABLET ORAL at 15:39

## 2020-01-20 RX ADMIN — IBUPROFEN 600 MG: 400 TABLET, FILM COATED ORAL at 10:26

## 2020-01-20 RX ADMIN — POTASSIUM CHLORIDE 40 MEQ: 1500 TABLET, EXTENDED RELEASE ORAL at 08:47

## 2020-01-20 RX ADMIN — METOPROLOL TARTRATE 12.5 MG: 25 TABLET, FILM COATED ORAL at 04:53

## 2020-01-20 RX ADMIN — POTASSIUM CHLORIDE 10 MEQ: 10 INJECTION, SOLUTION INTRAVENOUS at 09:13

## 2020-01-20 RX ADMIN — ACETAMINOPHEN 650 MG: 325 TABLET, FILM COATED ORAL at 10:26

## 2020-01-20 RX ADMIN — ALPRAZOLAM 0.5 MG: 0.5 TABLET ORAL at 08:48

## 2020-01-20 RX ADMIN — CEFTRIAXONE SODIUM 2 G: 2 INJECTION, POWDER, FOR SOLUTION INTRAMUSCULAR; INTRAVENOUS at 04:59

## 2020-01-20 RX ADMIN — AZITHROMYCIN MONOHYDRATE 500 MG: 500 INJECTION, POWDER, LYOPHILIZED, FOR SOLUTION INTRAVENOUS at 05:43

## 2020-01-20 RX ADMIN — GUAIFENESIN 200 MG: 100 SOLUTION ORAL at 09:12

## 2020-01-20 RX ADMIN — OMEPRAZOLE 20 MG: 20 CAPSULE, DELAYED RELEASE ORAL at 04:53

## 2020-01-20 RX ADMIN — SODIUM CHLORIDE: 9 INJECTION, SOLUTION INTRAVENOUS at 08:45

## 2020-01-20 RX ADMIN — FUROSEMIDE 20 MG: 10 INJECTION, SOLUTION INTRAMUSCULAR; INTRAVENOUS at 04:54

## 2020-01-20 RX ADMIN — METOPROLOL TARTRATE 12.5 MG: 25 TABLET, FILM COATED ORAL at 18:10

## 2020-01-20 RX ADMIN — POTASSIUM CHLORIDE 10 MEQ: 10 INJECTION, SOLUTION INTRAVENOUS at 12:12

## 2020-01-20 ASSESSMENT — ENCOUNTER SYMPTOMS
DIZZINESS: 0
BLURRED VISION: 0
HEARTBURN: 0
CONSTIPATION: 0
SHORTNESS OF BREATH: 1
DIARRHEA: 0
COUGH: 1
NAUSEA: 0
CHILLS: 0
PALPITATIONS: 0
SORE THROAT: 0
ABDOMINAL PAIN: 0
FOCAL WEAKNESS: 0
DEPRESSION: 0
WEAKNESS: 0
HEADACHES: 0
MYALGIAS: 0
BLOOD IN STOOL: 0
VOMITING: 0
FEVER: 0

## 2020-01-20 NOTE — PROGRESS NOTES
Pt higher level of came came from S 620-2 for A-fib RVR. Transferred to T 820 on [facility] with zoll. Pt converted to ST on way up. Pt is in stable condition. Pt put on tele monitor and monitor room notified. Pt

## 2020-01-20 NOTE — CARE PLAN
Problem: Communication  Goal: The ability to communicate needs accurately and effectively will improve  Outcome: PROGRESSING AS EXPECTED  Note:   Patient to utilize call light when needing assistance.      Problem: Safety  Goal: Will remain free from injury  Outcome: PROGRESSING AS EXPECTED  Note:   Bed in lowest position and alarms activated to prevent falls.

## 2020-01-20 NOTE — PROGRESS NOTES
2 RN skin check completed with Alex GUEVARA.   Devices in place oxygen.  Skin assessed under devices yes.  Confirmed pressure ulcers found on N/A.  New potential pressure ulcers noted on N/A. Wound consult placed N/A.  The following interventions in place silcone oxygen tubing with grey foam     Bilat ears red and blanching, silicone and grey foam in place     All other skin intact and blanching.   Will continue to monitor *

## 2020-01-20 NOTE — PROGRESS NOTES
HOSPITAL MEDICINE PROGRESS NOTE    Date of service  1/20/2020    Chief Complaint  Flu Like Symptoms; Body Aches; Headache; and Shortness of Breath      Hospital Course:     55-year-old woman who presented to the emergency department with chief complaint of fever, shortness of breath, most likely secondary to a acute viral syndrome, found to have sepsis, so admitted to the hospital for empiric antibiotic, IV fluid resuscitation.  She was being treated on the medical [place] when she suddenly went into atrial fibrillation, so was transferred to telemetry for treatment of her new cardiac arrhythmia.  In route to the telemetry, the patient spontaneous converted back into normal sinus rhythm and has remained in normal sinus rhythm since she arrived on the telemetry floor.    Review of the patient chest x-ray on 1/19/2020 show an infiltrate lateral lower lobe, could represent pneumonia.  He has been on empiric ceftriaxone since admission.       Interval History Updates:  No event overnight.  Afebrile.    Telemetry reviewed.  The patient is in normal sinus rhythm.  I order an EKG to confirm rhythm.  EKG was personally review today 1/20/2020 showed normal sinus rhythm without any ST or T wave changes.    Urine culture is review.  Mixed skin joselyn.  Already on empiric ceftriaxone for pulmonary condition.    Consultants/Specialty  None    Review of Systems   Review of Systems   Constitutional: Negative for chills and fever.   HENT: Negative for ear pain and sore throat.    Eyes: Negative for blurred vision.   Respiratory: Positive for cough and shortness of breath.    Cardiovascular: Negative for chest pain, palpitations and leg swelling.   Gastrointestinal: Negative for abdominal pain, blood in stool, constipation, diarrhea, heartburn, melena, nausea and vomiting.   Genitourinary: Negative for dysuria, hematuria and urgency.   Musculoskeletal: Negative for myalgias.   Skin: Negative for rash.   Neurological: Negative for  dizziness, focal weakness, weakness and headaches.   Endo/Heme/Allergies: Negative.    Psychiatric/Behavioral: Negative for depression.   All other systems reviewed and are negative.       Medications:  • guaiFENesin  10 mL Q4HRS PRN   • [START ON 1/21/2020] azithromycin  500 mg DAILY   • predniSONE  50 mg DAILY   • furosemide  20 mg BID DIURETIC   • metoprolol  12.5 mg TWICE DAILY   • menthol  1 Lozenge Q2HRS PRN   • ibuprofen  600 mg Q6HRS PRN   • omeprazole  20 mg DAILY   • senna-docusate  2 Tab BID    And   • polyethylene glycol/lytes  1 Packet QDAY PRN    And   • magnesium hydroxide  30 mL QDAY PRN    And   • bisacodyl  10 mg QDAY PRN   • Respiratory Care per Protocol   Continuous RT   • acetaminophen  650 mg Q6HRS PRN   • cefTRIAXone (ROCEPHIN) IV  2 g Q24HRS   • enalaprilat  1.25 mg Q6HRS PRN   • ondansetron  4 mg Q4HRS PRN   • ondansetron  4 mg Q4HRS PRN   • promethazine  12.5-25 mg Q4HRS PRN   • promethazine  12.5-25 mg Q4HRS PRN   • prochlorperazine  5-10 mg Q4HRS PRN   • benzonatate  100 mg TID PRN       Physical Exam   Vitals:    01/20/20 0002 01/20/20 0350 01/20/20 0749 01/20/20 1013   BP: 108/64 109/53 112/59 114/72   Pulse: 100 (!) 112 95 99   Resp: 18 19 18 20   Temp: 36.8 °C (98.2 °F) 36.7 °C (98 °F) 37.8 °C (100.1 °F) (!) 38.1 °C (100.6 °F)   TempSrc: Temporal Temporal Oral Temporal   SpO2: 95% 95% 92% 92%   Weight:       Height:           Physical Exam   Constitutional: She is oriented to person, place, and time. She appears distressed.   He appears a little bit anxious, mildly distressed.   HENT:   Head: Normocephalic and atraumatic.   Eyes: Pupils are equal, round, and reactive to light. Conjunctivae are normal. No scleral icterus.   Neck: Normal range of motion. Neck supple.   Cardiovascular: Normal rate, regular rhythm and intact distal pulses. Exam reveals no gallop and no friction rub.   No murmur heard.  Pulmonary/Chest: Effort normal and breath sounds normal. No respiratory distress. She  has no wheezes. She has no rales. She exhibits no tenderness.   Abdominal: Soft. Bowel sounds are normal. She exhibits no distension and no mass. There is no tenderness. There is no rebound and no guarding.   Musculoskeletal: Normal range of motion.         General: No tenderness or edema.   Neurological: She is alert and oriented to person, place, and time.   Skin: Skin is warm and dry. She is not diaphoretic.   Psychiatric: Mood and affect normal.   Nursing note and vitals reviewed.         Laboratory   Recent Labs     01/18/20  0032 01/19/20  0829 01/20/20  0425   WBC 9.4 11.0* 7.4   RBC 3.76* 3.88* 3.61*   HEMOGLOBIN 12.3 12.5 11.8*   HEMATOCRIT 35.3* 37.3 33.7*   PLATELETCT 215 267 287     Recent Labs     01/19/20  0829 01/19/20  2330 01/20/20  0425   SODIUM 138 134* 135   POTASSIUM 3.7 3.5* 3.5*   CHLORIDE 106 104 103   CO2 22 23 22   GLUCOSE 100* 120* 125*   BUN 8 6* 7*   CREATININE 0.67 0.70 0.71      Recent Labs     01/19/20  0829 01/19/20  2330 01/20/20  0425   ALTSGPT  --  34  --    ASTSGOT  --  27  --    ALKPHOSPHAT  --  231*  --    TBILIRUBIN  --  1.2  --    GLUCOSE 100* 120* 125*                Microbiology  Results     Procedure Component Value Units Date/Time    URINE CULTURE(NEW) [[id number]]  (Abnormal) Collected:  01/17/20 1111    Order Status:  Completed Specimen:  Urine Updated:  01/19/20 0808     Significant Indicator POS     Source UR     Site -     Culture Result Mixed skin joselyn 10-50,000 cfu/mL      Beta Streptococcus Group G  <10,000 cfu/mL      BLOOD CULTURE [[id number]] Collected:  01/17/20 1055    Order Status:  Completed Specimen:  Blood from Peripheral Updated:  01/18/20 0842     Significant Indicator NEG     Source BLD     Site PERIPHERAL     Culture Result No Growth  Note: Blood cultures are incubated for 5 days and  are monitored continuously.Positive blood cultures  are called to the RN and reported as soon as  they are identified.      Narrative:       Per Hospital Policy: Only  "change Specimen Src: to \"Line\" if  specified by physician order.  Right Hand    BLOOD CULTURE [[id number]] Collected:  01/17/20 1040    Order Status:  Completed Specimen:  Blood from Peripheral Updated:  01/18/20 0842     Significant Indicator NEG     Source BLD     Site PERIPHERAL     Culture Result No Growth  Note: Blood cultures are incubated for 5 days and  are monitored continuously.Positive blood cultures  are called to the RN and reported as soon as  they are identified.      Narrative:       Per Hospital Policy: Only change Specimen Src: to \"Line\" if  specified by physician order.  Left AC    URINALYSIS CULTURE, IF INDICATED [[id number]]  (Abnormal) Collected:  01/17/20 1111    Order Status:  Completed Specimen:  Urine Updated:  01/17/20 1340     Color Yellow     Character Clear     Specific Gravity 1.006     Ph 7.0     Glucose Negative mg/dL      Ketones Trace mg/dL      Protein 30 mg/dL      Bilirubin Negative     Urobilinogen, Urine 1.0     Nitrite Negative     Leukocyte Esterase Large     Occult Blood Negative     Micro Urine Req Microscopic    Culture Respiratory W/ GRM STN [[id number]]     Order Status:  Completed Specimen:  Respirate from Sputum     Blood Culture [[id number]]     Order Status:  Sent Specimen:  Blood from Peripheral     Blood Culture [[id number]]     Order Status:  Sent Specimen:  Blood from Peripheral     Influenza A/B By PCR (Adult - Flu Only) [[id number]] Collected:  01/17/20 0945    Order Status:  Completed Specimen:  Respirate from Nasopharyngeal Updated:  01/17/20 1025     Influenza virus A RNA Negative     Influenza virus B, PCR Negative             Labs reviewed by me and noted above.    Radiology  CXR 1/19/2020 personally reviewed and per my interpretation shows possible right lower lobe infiltrate consistent with acute infection.    EKG interpretation and reviewed personally as above.    Assessment and Plan    * Community acquired pneumonia  Assessment & Plan  Is on chest x-ray, " symptom, vital sign including fever this afternoon, most likely community-acquired pneumonia  Patient will been on ceftriaxone for admission empirically.  I added azithromycin for next 5 days for atypical coverage.    Atrial fibrillation with RVR (HCC)  Assessment & Plan  Single lone event on the day of transferring to telemetry.  No recurrence since.  Her stroke risk score is 1, no indication for long-term anticoagulation.  Continue to monitor.    Hyponatremia  Assessment & Plan  Suspect due to poor oral intake due to her symptoms.  This has resolved.    Anxiety  Assessment & Plan  She appears anxious this morning.  I order Xanax 0.5 mg p.o. x1 to alleviate her anxiety.    Acute respiratory failure with hypoxia (HCC)  Assessment & Plan  Secondary to suspect viral syndrome and community-acquired pneumonia.  New bronchodilator with RT protocol  Add prednisone 50 mg p.o. daily for 5 days.    Hypokalemia  Assessment & Plan  I ordered 20 mEq and 40 mEq of potassium replacement today.       DVT prophylaxis: SCD    CODE STATUS:  FULL CODE    Disposition: Patient is no longer in A. fib, she can transfer back to the medical floor.    Case was discussed with Primary RN, Charge Nurse, Medical SW, , and Pharmacist on IDTround and Pharmacist in addition to individual(s) mentioned above.    I have performed a physical exam and reviewed and updated ROS as of today, 1/20/2020.  In review of yesterday's note dated, 1/19/2020, there are no changes except as documented above.      Katheryn Prado M.D.

## 2020-01-20 NOTE — ASSESSMENT & PLAN NOTE
1/21-1/22 - Resolved, breathing better, feeling better, urinated lots  1/20 - Single lone event on the day of transferring to telemetry.  No recurrence since. Her stroke risk score is 1, no indication for long-term anticoagulation. Continue to monitor.  1/19 - Patient went into a.fib with RVR int he 130s.  Rapid was called, patient was transferred to telemetry, converted on the way to the telemetry floor. Troponin ordered, mag ordered, ECG ordered - a.fib with RVR, 20mg iv lasix ordered as patient is volume overloaded with IVF from sepsis.  Added azithromycin.  Expect patient to return to medical floor tomorrow or Tuesday, did not start anticoagulation as patient converted and has no hx of a.fib.

## 2020-01-20 NOTE — CARE PLAN
Problem: Hyperinflation:  Goal: Prevent or improve atelectasis  Outcome: PROGRESSING AS EXPECTED   IS to nursing  1250/1230

## 2020-01-20 NOTE — ASSESSMENT & PLAN NOTE
1/22 - did not quality for home O2 services  1/21 - improving, but on O2, walking resting in the morning to evaluate home oxygen needs  1/20 - Secondary to suspect viral syndrome and community-acquired pneumonia. New bronchodilator with RT protocol. Add prednisone 50 mg p.o. daily for 5 days.

## 2020-01-20 NOTE — PROGRESS NOTES
Received patient from [place] [id number], patient accompanied by family at bedside. Patient currently with temperature of 100.6 and complains of headache, motrin and tylenol given for symptoms per order. Patient's bed in lowest position and alarms set.

## 2020-01-20 NOTE — PROGRESS NOTES
"Pt was on  machine, pt was found to be tachycardic with HR of 180, then fluctuating down to HR of 40. HR was as high as 203.  Pt was on 2 L O2 via nasal cannula and SPO2% was 93%. Pt became symptomatic and presented with slight anxiety. Pt stated \"I just don't feel right, my heart is beating fast\". RN assessed pulse and felt fluctuations with HR. Rapid response team was paged, Dr. Gomez was paged. Dr. Gomez ordered EKG stat. VS were taken. Pt was found to be in A-fib with RVR with EKG. Pt was then transferred to a higher level of care to telemetry unit.    "

## 2020-01-21 LAB
CRP SERPL HS-MCNC: 29.49 MG/DL (ref 0–0.75)
MAGNESIUM SERPL-MCNC: 2.3 MG/DL (ref 1.5–2.5)
PROCALCITONIN SERPL-MCNC: 0.4 NG/ML

## 2020-01-21 PROCEDURE — 99232 SBSQ HOSP IP/OBS MODERATE 35: CPT | Performed by: STUDENT IN AN ORGANIZED HEALTH CARE EDUCATION/TRAINING PROGRAM

## 2020-01-21 PROCEDURE — 86140 C-REACTIVE PROTEIN: CPT

## 2020-01-21 PROCEDURE — 700102 HCHG RX REV CODE 250 W/ 637 OVERRIDE(OP): Performed by: INTERNAL MEDICINE

## 2020-01-21 PROCEDURE — A9270 NON-COVERED ITEM OR SERVICE: HCPCS | Performed by: HOSPITALIST

## 2020-01-21 PROCEDURE — 700102 HCHG RX REV CODE 250 W/ 637 OVERRIDE(OP): Performed by: STUDENT IN AN ORGANIZED HEALTH CARE EDUCATION/TRAINING PROGRAM

## 2020-01-21 PROCEDURE — 700111 HCHG RX REV CODE 636 W/ 250 OVERRIDE (IP): Performed by: HOSPITALIST

## 2020-01-21 PROCEDURE — 700105 HCHG RX REV CODE 258: Performed by: HOSPITALIST

## 2020-01-21 PROCEDURE — 36415 COLL VENOUS BLD VENIPUNCTURE: CPT

## 2020-01-21 PROCEDURE — 700102 HCHG RX REV CODE 250 W/ 637 OVERRIDE(OP): Performed by: HOSPITALIST

## 2020-01-21 PROCEDURE — A9270 NON-COVERED ITEM OR SERVICE: HCPCS | Performed by: STUDENT IN AN ORGANIZED HEALTH CARE EDUCATION/TRAINING PROGRAM

## 2020-01-21 PROCEDURE — 700111 HCHG RX REV CODE 636 W/ 250 OVERRIDE (IP): Performed by: INTERNAL MEDICINE

## 2020-01-21 PROCEDURE — 770006 HCHG ROOM/CARE - MED/SURG/GYN SEMI*

## 2020-01-21 PROCEDURE — A9270 NON-COVERED ITEM OR SERVICE: HCPCS | Performed by: INTERNAL MEDICINE

## 2020-01-21 PROCEDURE — 84145 PROCALCITONIN (PCT): CPT

## 2020-01-21 PROCEDURE — 83735 ASSAY OF MAGNESIUM: CPT

## 2020-01-21 RX ADMIN — AZITHROMYCIN 500 MG: 250 TABLET, FILM COATED ORAL at 04:47

## 2020-01-21 RX ADMIN — OMEPRAZOLE 20 MG: 20 CAPSULE, DELAYED RELEASE ORAL at 04:47

## 2020-01-21 RX ADMIN — PREDNISONE 50 MG: 50 TABLET ORAL at 04:47

## 2020-01-21 RX ADMIN — METOPROLOL TARTRATE 12.5 MG: 25 TABLET, FILM COATED ORAL at 04:47

## 2020-01-21 RX ADMIN — CEFTRIAXONE SODIUM 2 G: 2 INJECTION, POWDER, FOR SOLUTION INTRAMUSCULAR; INTRAVENOUS at 04:47

## 2020-01-21 NOTE — DIETARY
Nutrition Services:    MST score of 2 on Nutrition Admit Screen due to report of 2-13 lb loss in < 1 week and poor PO PTA. Upon visit, pt reports that her weight has been fluctuating. She said that she lost weight but has gained the weight back. No report of poor PO PTA. Pt is currently on a regular diet and per chart PO % of most meals. PO intake is adequate. Ht: 157.5 cm, Wt: 93.8 kg on 1/19/20 (standing scale), BMI 37.8.  Consult RD as needed. RD will re-screen weekly.      RD available prn

## 2020-01-21 NOTE — CARE PLAN
Problem: Communication  Goal: The ability to communicate needs accurately and effectively will improve  Outcome: PROGRESSING AS EXPECTED  Note:   Patient to utilize call light when in need of assistance.      Problem: Infection  Goal: Will remain free from infection  Outcome: PROGRESSING AS EXPECTED  Note:   Monitor patients labs and vitals for potential for infection.

## 2020-01-21 NOTE — CARE PLAN
Problem: Safety  Goal: Will remain free from injury  Outcome: PROGRESSING AS EXPECTED   Bed alarm placed for drowsiness, other wise SBA out of bed  Problem: Pain Management  Goal: Pain level will decrease to patient's comfort goal  Outcome: PROGRESSING AS EXPECTED   Medicate as requested per mar

## 2020-01-21 NOTE — RESPIRATORY CARE
COPD EDUCATION by COPD CLINICAL EDUCATOR  1/21/2020 at 7:45 AM by Alethea Silverman, RRT     Patient reviewed by COPD education team. Patient does not have a history or diagnosis of COPD and is a non-smoker, therefore does not qualify for the COPD program.

## 2020-01-21 NOTE — PROGRESS NOTES
Drowsy but arouses ,bed alarm placed for safety. Denies pain at present but will call if otherwise. Cont plan of care, call light within reach and visual checks through the night

## 2020-01-22 VITALS
WEIGHT: 206.79 LBS | BODY MASS INDEX: 38.05 KG/M2 | HEIGHT: 62 IN | DIASTOLIC BLOOD PRESSURE: 60 MMHG | TEMPERATURE: 97.5 F | RESPIRATION RATE: 16 BRPM | SYSTOLIC BLOOD PRESSURE: 121 MMHG | HEART RATE: 67 BPM | OXYGEN SATURATION: 90 %

## 2020-01-22 LAB
BACTERIA BLD CULT: NORMAL
BACTERIA BLD CULT: NORMAL
CRP SERPL HS-MCNC: 12.96 MG/DL (ref 0–0.75)
MAGNESIUM SERPL-MCNC: 2.3 MG/DL (ref 1.5–2.5)
PROCALCITONIN SERPL-MCNC: 0.33 NG/ML
SIGNIFICANT IND 70042: NORMAL
SIGNIFICANT IND 70042: NORMAL
SITE SITE: NORMAL
SITE SITE: NORMAL
SOURCE SOURCE: NORMAL
SOURCE SOURCE: NORMAL

## 2020-01-22 PROCEDURE — A9270 NON-COVERED ITEM OR SERVICE: HCPCS | Performed by: STUDENT IN AN ORGANIZED HEALTH CARE EDUCATION/TRAINING PROGRAM

## 2020-01-22 PROCEDURE — A9270 NON-COVERED ITEM OR SERVICE: HCPCS | Performed by: HOSPITALIST

## 2020-01-22 PROCEDURE — 86140 C-REACTIVE PROTEIN: CPT

## 2020-01-22 PROCEDURE — 83735 ASSAY OF MAGNESIUM: CPT

## 2020-01-22 PROCEDURE — 36415 COLL VENOUS BLD VENIPUNCTURE: CPT

## 2020-01-22 PROCEDURE — 84145 PROCALCITONIN (PCT): CPT

## 2020-01-22 PROCEDURE — 700102 HCHG RX REV CODE 250 W/ 637 OVERRIDE(OP): Performed by: STUDENT IN AN ORGANIZED HEALTH CARE EDUCATION/TRAINING PROGRAM

## 2020-01-22 PROCEDURE — 700102 HCHG RX REV CODE 250 W/ 637 OVERRIDE(OP): Performed by: HOSPITALIST

## 2020-01-22 PROCEDURE — 99239 HOSP IP/OBS DSCHRG MGMT >30: CPT | Performed by: STUDENT IN AN ORGANIZED HEALTH CARE EDUCATION/TRAINING PROGRAM

## 2020-01-22 PROCEDURE — 700111 HCHG RX REV CODE 636 W/ 250 OVERRIDE (IP): Performed by: INTERNAL MEDICINE

## 2020-01-22 RX ORDER — FUROSEMIDE 40 MG/1
40 TABLET ORAL
Qty: 10 TAB | Refills: 0 | Status: SHIPPED | OUTPATIENT
Start: 2020-01-22 | End: 2021-05-18

## 2020-01-22 RX ADMIN — PREDNISONE 50 MG: 50 TABLET ORAL at 04:21

## 2020-01-22 RX ADMIN — METOPROLOL TARTRATE 12.5 MG: 25 TABLET, FILM COATED ORAL at 04:22

## 2020-01-22 RX ADMIN — OMEPRAZOLE 20 MG: 20 CAPSULE, DELAYED RELEASE ORAL at 04:21

## 2020-01-22 NOTE — PROGRESS NOTES
Patient's resting SPO2 on room air was 90% as the lowest value. Patient then walked 880 ft without supplemental oxygen with the lowest SPO2 of 88%. The patient denied shortness of breath or chest pain throughout the entire exercise.

## 2020-01-22 NOTE — PROGRESS NOTES
Alert and able to let her needs known, up self with a steady gait. No needs at present and denies HA pain. She was told to leave her oxygen on tonight and will wean in the morning. Cont plan of care, call light within reach and visual checks through the night

## 2020-01-22 NOTE — CARE PLAN
Problem: Communication  Goal: The ability to communicate needs accurately and effectively will improve  Outcome: PROGRESSING AS EXPECTED  Note:   Patient able to communicate concerns via using the call light.      Problem: Knowledge Deficit  Goal: Knowledge of disease process/condition, treatment plan, diagnostic tests, and medications will improve  Outcome: PROGRESSING AS EXPECTED  Note:   Patient educated on the need for oxygen.

## 2020-01-22 NOTE — DISCHARGE INSTRUCTIONS
"Discharge Instructions per Karson Gomez D.O.    - Resources to Help Get Rid of Chronic Disease -    Physicians who have an approach to getting rid of systemic disease and sickness instead of managing it.  Please feel free to search the internet for them.    Bruce Tierney M.D. - [facility]    Erwin Delacruz M.D. - [facility] GP/Nephrologist    Authors who advocate for the same.  Huseyin Callahan - \"In Defense of Food\"        - Constipation -     Miralax 34g (two cap fulls, or two packets) every few days or sometimes daily for a few days will help keep the constipation in better check.  Use of metamucil once daily can help keep your bowels flowing smoothly.  Excessive gas from metamucil means you're probably more bound up than you had thought.  Switching back to miralax for a couple days can help alleviate symptoms.          - Daily Fluid Intake -      Don't \"drink lots of water\".  Don't \"stay hydrated\".  Don't \"drink half you weight in ounces every day.\" Especially since nobody seems to know who \"everyone\" is that keeps telling them to do these things.  These things should be regarded as completely erroneous.  The body has a very effective thirst mechanism that tells us when we should drink something.  Being \"thirsty all the time\" should be considered to have something wrong the same as \"hungry all the time\" or \"tired all the time\" or \"always depressed\" or \"sweating all the time\".  If you are waking numerous times each night to urinate, you're very likely consuming too much fluid.  It's also destroying your body's healing component from sleep.    There is such thing as drinking too much water!!!  You can exceed your kidneys' capability to process all of your water and you will put on weight if you do.  32-44oz of total drinking fluid intake per day is healthy.  You'll maybe need more if you're sweating from physical activity, but if you're sweating and breathing hard during exercise it's because you're burning " fat.  Don't replace it with excessive water.  Otherwise, only drink when you're thirsty and not too much.  Don't make a thing out of your water bottle. You do not need to carry around a water bottle on a daily basis.  It's totally unnecessary.  Remember drinking fountains and getting yelled at for taking too long and the line behind you? Remember when stores didn't have water bottle sections and gas stations didn't have water sections?    DO NOT REPLACE SODA INTAKE WITH WATER!!!!! It'll just turn into water weight.  Just stop drinking the soda and see if you stop being thirsty all the time when you drink it.       - Internationally recognized guidelines on daily water intake -      Normal adults are considered to have a basic water intake or generation of approximately 1600 mL per day (~48 oz.), composed of the following:      ?Ingested water - 500 mL - 16.7 oz... This is smaller than the water bottles bought by the case that are 16.9oz.   ?Water in food - 800 mL - Potatoes are half water.  Grains are half water, water xander...  It counts and it adds up.   ?Water from oxidation - 300 mL - chemical reactions in the body from the food you eat.         DIET: Diet Order Regular    ACTIVITY: as tolerated    DIAGNOSIS: Community acquired pneumonia    Return to ER if your O2 reading cannot improve above 90% with deep breathing.    Discharge Instructions    Discharged to home by car with relative. Discharged via wheelchair, hospital escort: Yes.  Special equipment needed: Not Applicable    Be sure to schedule a follow-up appointment with your primary care doctor or any specialists as instructed.     Discharge Plan:   Diet Plan: Discussed  Activity Level: Discussed  Confirmed Follow up Appointment: Patient to Call and Schedule Appointment  Confirmed Symptoms Management: Discussed  Medication Reconciliation Updated: Yes  Influenza Vaccine Indication: Not indicated: Previously immunized this influenza season and > 8 years of  age        Special Instructions: None    · Is patient discharged on Warfarin / Coumadin?   No     Depression / Suicide Risk    As you are discharged from this [facility] facility, it is important to learn how to keep safe from harming yourself.    Recognize the warning signs:  · Abrupt changes in personality, positive or negative- including increase in energy   · Giving away possessions  · Change in eating patterns- significant weight changes-  positive or negative  · Change in sleeping patterns- unable to sleep or sleeping all the time   · Unwillingness or inability to communicate  · Depression  · Unusual sadness, discouragement and loneliness  · Talk of wanting to die  · Neglect of personal appearance   · Rebelliousness- reckless behavior  · Withdrawal from people/activities they love  · Confusion- inability to concentrate     If you or a loved one observes any of these behaviors or has concerns about self-harm, here's what you can do:  · Talk about it- your feelings and reasons for harming yourself  · Remove any means that you might use to hurt yourself (examples: pills, rope, extension cords, firearm)  · Get professional help from the community (Mental Health, Substance Abuse, psychological counseling)  · Do not be alone:Call your Safe Contact- someone whom you trust who will be there for you.  · Call your local CRISIS HOTLINE 044-5186 or 168-539-2191  · Call your local Children's Mobile Crisis Response Team [place] (928) 302-2900 or www.Problemcity.com  · Call the toll free National Suicide Prevention Hotlines   · National Suicide Prevention Lifeline 551-604-KBVL (9635)  · National Hope Line Network 800-SUICIDE (000-9876)

## 2020-01-22 NOTE — PROGRESS NOTES
Hospitalist Daily Progress Note    Chief Complaint   Patient presents with   • Flu Like Symptoms   • Body Aches   • Headache   • Shortness of Breath    Patient was seen and evaluated today for Flu Like Symptoms   Hospital Course     ER Course  Glory Kessler is a 55 y.o. female who presents to the Emergency Department for fever and chills that began 4 days ago. In the last 24 hours, her symptoms have worsened and she has begun to experience shortness of breath with a dry cough. The patient reports mild nausea, diarrhea, mild abdominal cramping, and headache with temporal and maxillary sinus pressure. She denies vomiting or chest pain. She states she received a flu vaccine this year. Additionally, she denies any sick contact or travel outside the [place]. She states she has used ibuprofen, josh seltzer, and Theraflu with no symptom alleviation. The patient denies a history of smoking. Additionally, she denies any history of COPD, other lung problems, hypertension, or diabetes. She receives steroid injections for carpal tunnel. Additionally, she reports a history of pneumonia. The patient drinks a beer a week and denies drug use. Her last menstrual period was 4 years ago    Admission Day Course - 1/17  55 y.o. female who presented 1/17/2020 with flu-like symptoms over last 4 days.  Here influenza screen was negative.  She had no travel nor known sick contacts.  She does have some occassional nausea, congestion, sore throat and mild cough.  She also states body aches and short of breath.    1/18: Hypotension improved with IVF. Blood Cultures NGTD. Influenza neg. Procal minimally elevated. Suspect this may be r/t PNA, continue IV antibiotics and monitor.     1/19: WBC increased from yesterday at 11. Still with intermittent hypotensive episode, but afebrile. However,patient asymptomatic and looks clinically improved. She also reports feeling significantly better and w/o SOB or dyspnea. Will d/c IVF as patient is with  adequate PO intake and appears euvolemic.  Patient transferred to telemetry after A.fib RVR episode.  Given lasix.  1/20 - No event overnight.  Afebrile.  Telemetry reviewed.  The patient is in normal sinus rhythm. I order an EKG to confirm rhythm.  EKG was personally review today 1/20/2020 showed normal sinus rhythm without any ST or T wave changes. Urine culture is review.  Mixed skin joselyn.  Already on empiric ceftriaxone for pulmonary condition.  1/21 - Patient transferred back to medical floor, breathing much improved, O2 demand down, patient anxious to go home.    Consultants/Specialty:  None Procedures During Hospitalization:  None        Code Status: Full Code   VTE prophylaxis: Lovenox    Diet: Diet Order Regular  Hospital Day: 4     Disposition: Remain IP      Pat. Class: Inpatient    Assessment/Plan  * Community acquired pneumonia- (present on admission)  Assessment & Plan  1/21 - improving, O2 demand down.  1/20 - Is on chest x-ray, symptom, vital sign including fever this afternoon, most likely community-acquired pneumonia  Patient will been on ceftriaxone for admission empirically.  1/19: leukocytosis worse from yesterday. However, patient reports WOB improved and looks clinically improved. Blood cx no growth to date. Urine cx + for Group B strep, which may be contaminate. Will wait for s/s and adjust Abx accordingly. Continue supportive care and monitor.  1/18: Continue antibiotics, fluids symptom management.  Experienced some episodes of hypotension which resolved with LR bolus.  Blood cultures no growth to date.  Procal was elevated.  Leukocytosis has been resolved.  Continue to monitor blood cultures and adjust antibiotics accordingly  1/17 - adm - I added azithromycin for next 5 days for atypical coverage.1/17 (admission) : Likely viral although CXR showing right lower lobe opacification.Antitussives prn. Rocephin and azithromycin. Influenza negative  .      Atrial fibrillation with RVR  (HCC)  Assessment & Plan  1/21 - Resolved, breathing better, feeling better, urinated lots  1/20 - Single lone event on the day of transferring to telemetry.  No recurrence since. Her stroke risk score is 1, no indication for long-term anticoagulation. Continue to monitor.  1/19 - Patient went into a.fib with RVR int he 130s.  Rapid was called, patient was transferred to telemetry, converted on the way to the telemetry floor. Troponin ordered, mag ordered, ECG ordered - a.fib with RVR, 20mg iv lasix ordered as patient is volume overloaded with IVF from sepsis.  Added azithromycin.  Expect patient to return to medical floor tomorrow or Tuesday, did not start anticoagulation as patient converted and has no hx of a.fib.    Hyponatremia- (present on admission)  Assessment & Plan  1/21 - Resolved  1/20 - Suspect due to poor oral intake due to her symptoms. This has resolved  1/18-1/19: Resolving.  Likely related to dehydration.  Continue to monitor with BMP  1/17(admission): States poor oral intake since feeling ill. IV fluids.    Anxiety  Assessment & Plan  She appears anxious this morning.  I order Xanax 0.5 mg p.o. x1 to alleviate her anxiety.    Acute respiratory failure with hypoxia (HCC)  Assessment & Plan  1/21 - improving, but on O2, walking resting in the morning to evaluate home oxygen needs  1/20 - Secondary to suspect viral syndrome and community-acquired pneumonia. New bronchodilator with RT protocol. Add prednisone 50 mg p.o. daily for 5 days.    Hypokalemia  Assessment & Plan  1/21 - stable  1/20 - I ordered 20 mEq and 40 mEq of potassium replacement today.  1/18-1/19 : Resolved.  Continue to monitor with BMP  1/17 (admission): Check Mg. Replace K. Monitor labs         Laboratory  Results from last 7 days   Lab Units 01/20/20  0425 01/19/20  0829   WBC 1501 K/uL 7.4 11.0*   HGB 1503 g/dL 11.8* 12.5   HCT 1504 % 33.7* 37.3   PLATELET COUNT 1518 K/uL 287 267    Results from last 7 days   Lab Units  01/21/20  0210 01/20/20  0425 01/19/20  2330   SODIUM 101 mmol/L  --  135 134*   POTASSIUM 102 mmol/L  --  3.5* 3.5*   CHLORIDE 103 mmol/L  --  103 104   CO2 104 mmol/L  --  22 23   ANION GAP ANION   --  10.0 7.0    mg/dL  --  7* 6*   CREATININE 109 mg/dL  --  0.71 0.70   CALCIUM 105 mg/dL  --  8.2* 8.2*   GLUCOSE 112 mg/dL  --  125* 120*   IF EDGARDO AMER 502166 mL/min/1.73 m 2  --  >60 >60   IF NON AFRICAN AMER 109GFRB mL/min/1.73 m 2  --  >60 >60   MAGNESIUM 561 mg/dL 2.3 1.6 1.6             Intake/Output Summary (Last 24 hours) at 1/21/2020 2134  Last data filed at 1/21/2020 1320  Gross per 24 hour   Intake 870 ml   Output --   Net 870 ml    Vital Signs  Temp:  [36.2 °C (97.1 °F)-36.7 °C (98 °F)] 36.7 °C (98 °F)  Pulse:  [53-76] 76  Resp:  [16-20] 17  BP: ()/(43-81) 102/43  SpO2:  [91 %-98 %] 93 %   Review of Systems  Review of Systems   Constitutional: Negative for fever and malaise/fatigue.   Skin: Negative for itching and rash.   Psychiatric/Behavioral: Negative for depression. The patient is not nervous/anxious.     Physical Exam  Physical Exam     Medications  predniSONE, 50 mg, Oral, DAILY  metoprolol, 12.5 mg, Oral, TWICE DAILY  omeprazole, 20 mg, Oral, DAILY  senna-docusate, 2 Tab, Oral, BID       Medications were reviewed today.     Pertinent Imaging Reviewed:   1 -

## 2020-01-22 NOTE — CARE PLAN
Problem: Infection  Goal: Will remain free from infection  Outcome: PROGRESSING AS EXPECTED  Iv abx as ordered per mar  Problem: Respiratory:  Goal: Respiratory status will improve  Outcome: PROGRESSING AS EXPECTED  Attempting to wean oxygen

## 2020-01-23 ENCOUNTER — TELEPHONE (OUTPATIENT)
Dept: SCHEDULING | Facility: IMAGING CENTER | Age: 55
End: 2020-01-23

## 2020-01-26 ASSESSMENT — ENCOUNTER SYMPTOMS
FEVER: 0
DEPRESSION: 0
NERVOUS/ANXIOUS: 0

## 2020-01-26 NOTE — DISCHARGE SUMMARY
Discharge Summary    CHIEF COMPLAINT ON ADMISSION  Chief Complaint   Patient presents with   • Flu Like Symptoms   • Body Aches   • Headache   • Shortness of Breath       Reason for Admission  Flue like symptoms     Admission Date  1/17/2020    CODE STATUS  Prior    HPI & HOSPITAL COURSE    ER Course  Glory Kessler is a 55 y.o. female who presents to the Emergency Department for fever and chills that began 4 days ago. In the last 24 hours, her symptoms have worsened and she has begun to experience shortness of breath with a dry cough. The patient reports mild nausea, diarrhea, mild abdominal cramping, and headache with temporal and maxillary sinus pressure. She denies vomiting or chest pain. She states she received a flu vaccine this year. Additionally, she denies any sick contact or travel outside the [place]. She states she has used ibuprofen, josh seltzer, and Theraflu with no symptom alleviation. The patient denies a history of smoking. Additionally, she denies any history of COPD, other lung problems, hypertension, or diabetes. She receives steroid injections for carpal tunnel. Additionally, she reports a history of pneumonia. The patient drinks a beer a week and denies drug use. Her last menstrual period was 4 years ago    Admission Day Course - 1/17  55 y.o. female who presented 1/17/2020 with flu-like symptoms over last 4 days.  Here influenza screen was negative.  She had no travel nor known sick contacts.  She does have some occassional nausea, congestion, sore throat and mild cough.  She also states body aches and short of breath.    1/18: Hypotension improved with IVF. Blood Cultures NGTD. Influenza neg. Procal minimally elevated. Suspect this may be r/t PNA, continue IV antibiotics and monitor.     1/19: WBC increased from yesterday at 11. Still with intermittent hypotensive episode, but afebrile. However,patient asymptomatic and looks clinically improved. She also reports feeling significantly better  and w/o SOB or dyspnea. Will d/c IVF as patient is with adequate PO intake and appears euvolemic.  Patient transferred to telemetry after A.fib RVR episode.  Given lasix.  1/20 - No event overnight.  Afebrile.  Telemetry reviewed.  The patient is in normal sinus rhythm. I order an EKG to confirm rhythm.  EKG was personally review today 1/20/2020 showed normal sinus rhythm without any ST or T wave changes. Urine culture is review.  Mixed skin joselyn.  Already on empiric ceftriaxone for pulmonary condition.  1/21 - Patient transferred back to medical floor, breathing much improved, O2 demand down, patient anxious to go home.  1/22 - did not qualify for home O2, patient felt better, requested to be discharged, medically stable for discharge home    Consultants/Specialty:  None Procedures During Hospitalization:  None     Discharge Day Exam    Review of Systems  ROS Physical Exam  Physical Exam     Vital Signs       Discharge Day Assessment/Plan  * Community acquired pneumonia- (present on admission)  Assessment & Plan  1/22 - while on O2, did not meet criteria for home O2 services, medically stable for discharge.  Able to maintain >92% with deep breathing.  Patient stated she'll buy a home O2 sensor and rest when/if it drops  1/21 - improving, O2 demand down.  1/20 - Is on chest x-ray, symptom, vital sign including fever this afternoon, most likely community-acquired pneumonia  Patient will been on ceftriaxone for admission empirically.  1/19: leukocytosis worse from yesterday. However, patient reports WOB improved and looks clinically improved. Blood cx no growth to date. Urine cx + for Group B strep, which may be contaminate. Will wait for s/s and adjust Abx accordingly. Continue supportive care and monitor.  1/18: Continue antibiotics, fluids symptom management.  Experienced some episodes of hypotension which resolved with LR bolus.  Blood cultures no growth to date.  Procal was elevated.  Leukocytosis has been  resolved.  Continue to monitor blood cultures and adjust antibiotics accordingly  1/17 - adm - I added azithromycin for next 5 days for atypical coverage.1/17 (admission) : Likely viral although CXR showing right lower lobe opacification.Antitussives prn. Rocephin and azithromycin. Influenza negative  .      Atrial fibrillation with RVR (HCC)  Assessment & Plan  1/21-1/22 - Resolved, breathing better, feeling better, urinated lots  1/20 - Single lone event on the day of transferring to telemetry.  No recurrence since. Her stroke risk score is 1, no indication for long-term anticoagulation. Continue to monitor.  1/19 - Patient went into a.fib with RVR int he 130s.  Rapid was called, patient was transferred to telemetry, converted on the way to the telemetry floor. Troponin ordered, mag ordered, ECG ordered - a.fib with RVR, 20mg iv lasix ordered as patient is volume overloaded with IVF from sepsis.  Added azithromycin.  Expect patient to return to medical floor tomorrow or Tuesday, did not start anticoagulation as patient converted and has no hx of a.fib.    Hyponatremia- (present on admission)  Assessment & Plan  1/21-1/22 - Resolved  1/20 - Suspect due to poor oral intake due to her symptoms. This has resolved  1/18-1/19: Resolving.  Likely related to dehydration.  Continue to monitor with BMP  1/17(admission): States poor oral intake since feeling ill. IV fluids.    Anxiety  Assessment & Plan  She appears anxious this morning.  I order Xanax 0.5 mg p.o. x1 to alleviate her anxiety.    Acute respiratory failure with hypoxia (HCC)  Assessment & Plan  1/22 - did not quality for home O2 services  1/21 - improving, but on O2, walking resting in the morning to evaluate home oxygen needs  1/20 - Secondary to suspect viral syndrome and community-acquired pneumonia. New bronchodilator with RT protocol. Add prednisone 50 mg p.o. daily for 5 days.    Hypokalemia  Assessment & Plan  1/21-1/22 - stable  1/20 - I ordered 20 mEq  and 40 mEq of potassium replacement today.  1/18-1/19 : Resolved.  Continue to monitor with BMP  1/17 (admission): Check Mg. Replace K. Monitor labs             Therefore, she is discharged in good and stable condition to home with close outpatient follow-up.    The patient met 2-midnight criteria for an inpatient stay at the time of discharge.    Discharge Date  1/22/2020    FOLLOW UP ITEMS POST DISCHARGE  None    DISCHARGE DIAGNOSES  Principal Problem:    Community acquired pneumonia POA: Yes  Active Problems:    Hyponatremia POA: Yes    Atrial fibrillation with RVR (HCC) POA: No    Acute respiratory failure with hypoxia (HCC) POA: No    Anxiety POA: No      Overview: Patient appears anxious on exam this morning.      Xanax 0.5 mg p.o. x1.      Reassurance offered.    Hypokalemia POA: Unknown  Resolved Problems:    * No resolved hospital problems. *      FOLLOW UP  Future Appointments   Date Time Provider Department Center   3/4/2020  9:10 AM An aLilia Sung M.D. [facility]         Schedule an appointment as soon as possible for a visit  Post hospitalization follow-up, [place] [facility]    Pcp Pt States None            MEDICATIONS ON DISCHARGE     Medication List      START taking these medications      Instructions   furosemide 40 MG Tabs  Commonly known as:  LASIX   Take 1 Tab by mouth 1 time daily as needed for up to 10 doses.  Dose:  40 mg        CONTINUE taking these medications      Instructions   acetaminophen 500 MG Tabs  Commonly known as:  TYLENOL   Take 500-1,000 mg by mouth every 6 hours as needed.  Dose:  500-1,000 mg     DAILY VITAMINS PO   Take 1 Tab by mouth every day.  Dose:  1 Tab     ibuprofen 600 MG Tabs  Commonly known as:  MOTRIN   Take 1 Tab by mouth every 6 hours as needed.  Dose:  600 mg            Allergies  Allergies   Allergen Reactions   • Pcn [Penicillins]        DIET  No orders of the defined types were placed in this encounter.      ACTIVITY  As tolerated.  Weight bearing as  tolerated      LABORATORY  Lab Results   Component Value Date    SODIUM 135 01/20/2020    POTASSIUM 3.5 (L) 01/20/2020    CHLORIDE 103 01/20/2020    CO2 22 01/20/2020    GLUCOSE 125 (H) 01/20/2020    BUN 7 (L) 01/20/2020    CREATININE 0.71 01/20/2020        Lab Results   Component Value Date    WBC 7.4 01/20/2020    HEMOGLOBIN 11.8 (L) 01/20/2020    HEMATOCRIT 33.7 (L) 01/20/2020    PLATELETCT 287 01/20/2020        Total time of the discharge process exceeds 35 minutes.

## 2020-01-28 NOTE — DOCUMENTATION QUERY
Former[facility]                                                                       Query Response Note      PATIENT:               PAMELA ESPINOZA  ACCT #:                  [id number]  MRN:                     [id number]  :                      1965  ADMIT DATE:       2020 9:11 AM  DISCH DATE:        2020 1:16 PM  RESPONDING  PROVIDER #:        [id number]           QUERY TEXT:    Please clarify in documentation the relationship, if any, between Sepsis and Acute respiratory failure.     NOTE:  If an appropriate response is not listed below, please respond with a new note.    The patient's Clinical Indicators include:  Admit Vitals   Temp:  [36.4 °C (97.5 °F)-38.2 °C (100.7 °F)] 36.9 °C (98.4 °F)  Pulse:  [] 107  Resp:  [16-20] 20  BP: ()/(49-76) 95/49    LABS -   WBC 11.9, 9.4, 11.0     PN, Karson Gomez, Do  Volume overloaded with IVF from sepsis.      PN, Katheryn Prado MD  Hospital Course:     55-year-old woman who presented to the emergency department with chief complaint of fever, shortness of breath, most likely secondary to a acute viral syndrome, found to have sepsis, so admitted to the hospital for empiric antibiotic, IV fluid resuscitation.      Assessment and Plan  Community acquired pneumonia  Atrial fibrillation with RVR (HCC)    Acute respiratory failure with hypoxia (HCC)  Assessment & Plan  Secondary to suspect viral syndrome and community-acquired pneumonia.  New bronchodilator with RT protocol  Add prednisone 50 mg p.o. daily for 5 days.    Treatment: IV antibiotics, IVF, CXR, labs, cultures, RT protocol    Risk factors: CAP, Sepsis, hypotension, hyponatremia, atrial fibrillation, acute respiratory failure  Options provided:   -- Severe sepsis with associated acute respiratory failure present on admission   -- Sepsis without associated acute organ failure/dysfunction   -- Unable to  determine      Query created by: Rachid Samuels on 1/28/2020 2:14 PM    RESPONSE TEXT:    Sepsis without associated acute organ failure/dysfunction          Electronically signed by:  Katheryn Prado MD 1/28/2020 3:57 PM

## 2021-03-15 DIAGNOSIS — Z23 NEED FOR VACCINATION: ICD-10-CM

## 2021-05-18 ENCOUNTER — OFFICE VISIT (OUTPATIENT)
Dept: URGENT CARE | Facility: PHYSICIAN GROUP | Age: 56
End: 2021-05-18
Payer: COMMERCIAL

## 2021-05-18 VITALS
OXYGEN SATURATION: 95 % | SYSTOLIC BLOOD PRESSURE: 110 MMHG | HEART RATE: 78 BPM | HEIGHT: 63 IN | DIASTOLIC BLOOD PRESSURE: 70 MMHG | TEMPERATURE: 98 F | RESPIRATION RATE: 16 BRPM | WEIGHT: 212 LBS | BODY MASS INDEX: 37.56 KG/M2

## 2021-05-18 DIAGNOSIS — B02.9 HERPES ZOSTER WITHOUT COMPLICATION: ICD-10-CM

## 2021-05-18 DIAGNOSIS — M79.10 MYALGIA: ICD-10-CM

## 2021-05-18 DIAGNOSIS — R11.0 NAUSEA: ICD-10-CM

## 2021-05-18 PROCEDURE — 99214 OFFICE O/P EST MOD 30 MIN: CPT | Performed by: PHYSICIAN ASSISTANT

## 2021-05-18 RX ORDER — VALACYCLOVIR HYDROCHLORIDE 1 G/1
1000 TABLET, FILM COATED ORAL 3 TIMES DAILY
Qty: 21 TABLET | Refills: 0 | Status: SHIPPED | OUTPATIENT
Start: 2021-05-18 | End: 2021-05-25

## 2021-05-18 RX ORDER — TRIAMCINOLONE ACETONIDE 0.25 MG/G
CREAM TOPICAL
Qty: 15 G | Refills: 1 | Status: SHIPPED | OUTPATIENT
Start: 2021-05-18

## 2021-05-18 ASSESSMENT — ENCOUNTER SYMPTOMS
VOMITING: 0
ABDOMINAL PAIN: 0
NAUSEA: 1
FEVER: 0
DIARRHEA: 0
CHILLS: 0

## 2021-05-18 ASSESSMENT — FIBROSIS 4 INDEX: FIB4 SCORE: 0.89

## 2021-05-18 NOTE — PROGRESS NOTES
"Subjective:   Glory Kessler  is a 56 y.o. female who presents for Bug Bite (left arm and upper back, 6x days ago was cleaning the garage and yard work )      HPI  Patient presents urgent care complaining of concern for insect bites to left arm and upper back that occurred 6 days ago.  She states that the day prior she was cleaning the garage in the yard.  She complains of burning pain worsening of rash over interim.  She notes most recently with new cluster of \"bites\" over the last 2 days.  Denies fevers chills but notes fatigue and body aches.  Does describe some achiness in same distribution of rash.  Notes some nausea without vomiting.  Complains of burning discomfort.  Notes past medical history of varicella in childhood.  Has tried hydrocortisone cream with minimal change in rash.     Review of Systems   Constitutional: Negative for chills and fever.   Gastrointestinal: Positive for nausea ( very mild). Negative for abdominal pain, diarrhea and vomiting.   Skin: Positive for rash ( burning). Negative for itching.       Allergies   Allergen Reactions   • Pcn [Penicillins]         Objective:   /70 (BP Location: Right arm, Patient Position: Sitting, BP Cuff Size: Large adult)   Pulse 78   Temp 36.7 °C (98 °F) (Temporal)   Resp 16   Ht 1.6 m (5' 3\")   Wt 96.2 kg (212 lb)   LMP 07/07/2016 (Exact Date)   SpO2 95%   BMI 37.55 kg/m²     Physical Exam  Vitals and nursing note reviewed.   Constitutional:       General: She is not in acute distress.     Appearance: She is well-developed. She is not diaphoretic.   HENT:      Head: Normocephalic and atraumatic.      Right Ear: External ear normal.      Left Ear: External ear normal.      Nose: Nose normal.   Eyes:      General: Lids are normal. No scleral icterus.        Right eye: No discharge.         Left eye: No discharge.      Conjunctiva/sclera: Conjunctivae normal.   Pulmonary:      Effort: Pulmonary effort is normal. No respiratory distress. "   Musculoskeletal:         General: Normal range of motion.      Cervical back: Neck supple.   Skin:     General: Skin is warm and dry.      Coloration: Skin is not pale.      Findings: Erythema and rash present. Rash is vesicular.             Comments: Multiple clusters of vesicles with erythematous base, nonulcerative, nonurticarial, nonscaling, all seen on left side in multiple dermatomal distribution   Neurological:      Mental Status: She is alert and oriented to person, place, and time. She is not disoriented.   Psychiatric:         Speech: Speech normal.         Behavior: Behavior normal.         Assessment/Plan:   1. Herpes zoster without complication  - valacyclovir (VALTREX) 1 GM Tab; Take 1 tablet by mouth 3 times a day for 7 days.  Dispense: 21 tablet; Refill: 0  - triamcinolone acetonide (KENALOG) 0.025 % Cream; Apply to affected area twice daily  Dispense: 15 g; Refill: 1    2. Nausea    3. Myalgia  Advised to avoid immunocompromised, children without hx of varicella and/or vaccine, and pregnant women. Advised minimal touch to rash, good hand hygiene.  May cover if she chooses.  Keep clean and dry.    Rx valtrex  Return to clinic with lack of resolution or progression of symptoms.      I have worn an N95 mask, gloves and eye protection for the entire encounter with this patient.     Differential diagnosis, natural history, supportive care, and indications for immediate follow-up discussed.